# Patient Record
Sex: MALE | Race: BLACK OR AFRICAN AMERICAN | Employment: FULL TIME | URBAN - NONMETROPOLITAN AREA
[De-identification: names, ages, dates, MRNs, and addresses within clinical notes are randomized per-mention and may not be internally consistent; named-entity substitution may affect disease eponyms.]

---

## 2018-06-04 ENCOUNTER — HOSPITAL ENCOUNTER (EMERGENCY)
Age: 48
Discharge: HOME | End: 2018-06-04
Payer: COMMERCIAL

## 2018-06-04 VITALS
RESPIRATION RATE: 14 BRPM | SYSTOLIC BLOOD PRESSURE: 156 MMHG | DIASTOLIC BLOOD PRESSURE: 92 MMHG | OXYGEN SATURATION: 98 % | TEMPERATURE: 98.9 F | HEART RATE: 86 BPM

## 2018-06-04 DIAGNOSIS — L03.116 CELLULITIS OF LEFT LOWER LEG: Primary | ICD-10-CM

## 2018-06-04 PROCEDURE — 99212 OFFICE O/P EST SF 10 MIN: CPT

## 2018-06-04 PROCEDURE — 90471 IMMUNIZATION ADMIN: CPT | Performed by: NURSE PRACTITIONER

## 2018-06-04 PROCEDURE — 99203 OFFICE O/P NEW LOW 30 MIN: CPT | Performed by: NURSE PRACTITIONER

## 2018-06-04 PROCEDURE — 90715 TDAP VACCINE 7 YRS/> IM: CPT | Performed by: NURSE PRACTITIONER

## 2018-06-04 PROCEDURE — 6360000002 HC RX W HCPCS: Performed by: NURSE PRACTITIONER

## 2018-06-04 RX ORDER — MUPIROCIN CALCIUM 20 MG/G
CREAM TOPICAL
Qty: 1 TUBE | Refills: 0 | Status: SHIPPED | OUTPATIENT
Start: 2018-06-04 | End: 2018-07-04

## 2018-06-04 RX ORDER — CEPHALEXIN 500 MG/1
500 CAPSULE ORAL 4 TIMES DAILY
Qty: 40 CAPSULE | Refills: 0 | Status: SHIPPED | OUTPATIENT
Start: 2018-06-04 | End: 2018-06-14

## 2018-06-04 RX ADMIN — TETANUS TOXOID, REDUCED DIPHTHERIA TOXOID AND ACELLULAR PERTUSSIS VACCINE, ADSORBED 0.5 ML: 5; 2.5; 8; 8; 2.5 SUSPENSION INTRAMUSCULAR at 11:15

## 2018-06-04 ASSESSMENT — PAIN DESCRIPTION - DESCRIPTORS: DESCRIPTORS: BURNING

## 2018-06-04 ASSESSMENT — PAIN SCALES - GENERAL: PAINLEVEL_OUTOF10: 6

## 2018-06-04 ASSESSMENT — ENCOUNTER SYMPTOMS
COUGH: 0
NAUSEA: 0
SHORTNESS OF BREATH: 0
DIARRHEA: 0
ABDOMINAL PAIN: 0
ROS SKIN COMMENTS: SEE HPI
VOMITING: 0

## 2018-06-04 ASSESSMENT — PAIN DESCRIPTION - LOCATION: LOCATION: LEG

## 2018-06-04 ASSESSMENT — PAIN DESCRIPTION - PAIN TYPE: TYPE: ACUTE PAIN

## 2018-06-04 ASSESSMENT — PAIN DESCRIPTION - FREQUENCY: FREQUENCY: CONTINUOUS

## 2018-06-04 ASSESSMENT — PAIN DESCRIPTION - ORIENTATION: ORIENTATION: LEFT

## 2022-04-27 ENCOUNTER — HOSPITAL ENCOUNTER (INPATIENT)
Age: 52
LOS: 1 days | Discharge: HOME | DRG: 100 | End: 2022-04-28
Attending: EMERGENCY MEDICINE
Payer: COMMERCIAL

## 2022-04-27 ENCOUNTER — APPOINTMENT (OUTPATIENT)
Dept: MRI IMAGING | Age: 52
DRG: 100 | End: 2022-04-27
Payer: COMMERCIAL

## 2022-04-27 ENCOUNTER — APPOINTMENT (OUTPATIENT)
Dept: CT IMAGING | Age: 52
DRG: 100 | End: 2022-04-27
Payer: COMMERCIAL

## 2022-04-27 ENCOUNTER — APPOINTMENT (OUTPATIENT)
Dept: GENERAL RADIOLOGY | Age: 52
DRG: 100 | End: 2022-04-27
Payer: COMMERCIAL

## 2022-04-27 DIAGNOSIS — R56.9 SEIZURE (HCC): Primary | ICD-10-CM

## 2022-04-27 LAB
ALBUMIN SERPL-MCNC: 4.1 G/DL (ref 3.5–5.1)
ALP BLD-CCNC: 106 U/L (ref 38–126)
ALT SERPL-CCNC: 14 U/L (ref 11–66)
AMMONIA: 16 UMOL/L (ref 11–60)
AMPHETAMINE+METHAMPHETAMINE URINE SCREEN: NEGATIVE
ANION GAP SERPL CALCULATED.3IONS-SCNC: 14 MEQ/L (ref 8–16)
AST SERPL-CCNC: 20 U/L (ref 5–40)
BARBITURATE QUANTITATIVE URINE: NEGATIVE
BASOPHILS # BLD: 0.1 %
BASOPHILS ABSOLUTE: 0 THOU/MM3 (ref 0–0.1)
BENZODIAZEPINE QUANTITATIVE URINE: NEGATIVE
BILIRUB SERPL-MCNC: 0.4 MG/DL (ref 0.3–1.2)
BUN BLDV-MCNC: 12 MG/DL (ref 7–22)
CALCIUM SERPL-MCNC: 9.4 MG/DL (ref 8.5–10.5)
CANNABINOID QUANTITATIVE URINE: POSITIVE
CHLORIDE BLD-SCNC: 102 MEQ/L (ref 98–111)
CO2: 22 MEQ/L (ref 23–33)
COCAINE METABOLITE QUANTITATIVE URINE: NEGATIVE
CREAT SERPL-MCNC: 1.1 MG/DL (ref 0.4–1.2)
EKG ATRIAL RATE: 95 BPM
EKG P AXIS: 75 DEGREES
EKG P-R INTERVAL: 158 MS
EKG Q-T INTERVAL: 344 MS
EKG QRS DURATION: 68 MS
EKG QTC CALCULATION (BAZETT): 432 MS
EKG R AXIS: 98 DEGREES
EKG T AXIS: 63 DEGREES
EKG VENTRICULAR RATE: 95 BPM
EOSINOPHIL # BLD: 0.9 %
EOSINOPHILS ABSOLUTE: 0.1 THOU/MM3 (ref 0–0.4)
ERYTHROCYTE [DISTWIDTH] IN BLOOD BY AUTOMATED COUNT: 12.6 % (ref 11.5–14.5)
ERYTHROCYTE [DISTWIDTH] IN BLOOD BY AUTOMATED COUNT: 37.8 FL (ref 35–45)
ETHYL ALCOHOL, SERUM: < 0.01 %
GFR SERPL CREATININE-BSD FRML MDRD: 70 ML/MIN/1.73M2
GLUCOSE BLD-MCNC: 201 MG/DL (ref 70–108)
GLUCOSE BLD-MCNC: 205 MG/DL (ref 70–108)
HCT VFR BLD CALC: 46.7 % (ref 42–52)
HEMOGLOBIN: 15.1 GM/DL (ref 14–18)
IMMATURE GRANS (ABS): 0.06 THOU/MM3 (ref 0–0.07)
IMMATURE GRANULOCYTES: 0.4 %
LYMPHOCYTES # BLD: 7.3 %
LYMPHOCYTES ABSOLUTE: 1.2 THOU/MM3 (ref 1–4.8)
MAGNESIUM: 2 MG/DL (ref 1.6–2.4)
MCH RBC QN AUTO: 26.7 PG (ref 26–33)
MCHC RBC AUTO-ENTMCNC: 32.3 GM/DL (ref 32.2–35.5)
MCV RBC AUTO: 82.5 FL (ref 80–94)
MONOCYTES # BLD: 6.6 %
MONOCYTES ABSOLUTE: 1.1 THOU/MM3 (ref 0.4–1.3)
NUCLEATED RED BLOOD CELLS: 0 /100 WBC
OPIATES, URINE: NEGATIVE
OSMOLALITY CALCULATION: 281.4 MOSMOL/KG (ref 275–300)
OXYCODONE: NEGATIVE
PHENCYCLIDINE QUANTITATIVE URINE: NEGATIVE
PLATELET # BLD: 255 THOU/MM3 (ref 130–400)
PMV BLD AUTO: 8.9 FL (ref 9.4–12.4)
POTASSIUM REFLEX MAGNESIUM: 4.1 MEQ/L (ref 3.5–5.2)
PRO-BNP: 111.7 PG/ML (ref 0–900)
RBC # BLD: 5.66 MILL/MM3 (ref 4.7–6.1)
SEG NEUTROPHILS: 84.7 %
SEGMENTED NEUTROPHILS ABSOLUTE COUNT: 13.6 THOU/MM3 (ref 1.8–7.7)
SODIUM BLD-SCNC: 138 MEQ/L (ref 135–145)
TOTAL CK: 654 U/L (ref 55–170)
TOTAL PROTEIN: 7 G/DL (ref 6.1–8)
TROPONIN T: < 0.01 NG/ML
WBC # BLD: 16 THOU/MM3 (ref 4.8–10.8)

## 2022-04-27 PROCEDURE — 95819 EEG AWAKE AND ASLEEP: CPT | Performed by: PSYCHIATRY & NEUROLOGY

## 2022-04-27 PROCEDURE — 2580000003 HC RX 258: Performed by: PHYSICIAN ASSISTANT

## 2022-04-27 PROCEDURE — 96366 THER/PROPH/DIAG IV INF ADDON: CPT

## 2022-04-27 PROCEDURE — 82550 ASSAY OF CK (CPK): CPT

## 2022-04-27 PROCEDURE — 2580000003 HC RX 258: Performed by: EMERGENCY MEDICINE

## 2022-04-27 PROCEDURE — 80053 COMPREHEN METABOLIC PANEL: CPT

## 2022-04-27 PROCEDURE — 93010 ELECTROCARDIOGRAM REPORT: CPT | Performed by: INTERNAL MEDICINE

## 2022-04-27 PROCEDURE — 2060000000 HC ICU INTERMEDIATE R&B

## 2022-04-27 PROCEDURE — 71045 X-RAY EXAM CHEST 1 VIEW: CPT

## 2022-04-27 PROCEDURE — 96375 TX/PRO/DX INJ NEW DRUG ADDON: CPT

## 2022-04-27 PROCEDURE — 82077 ASSAY SPEC XCP UR&BREATH IA: CPT

## 2022-04-27 PROCEDURE — 84484 ASSAY OF TROPONIN QUANT: CPT

## 2022-04-27 PROCEDURE — 82948 REAGENT STRIP/BLOOD GLUCOSE: CPT

## 2022-04-27 PROCEDURE — 6360000002 HC RX W HCPCS: Performed by: PHYSICIAN ASSISTANT

## 2022-04-27 PROCEDURE — A9579 GAD-BASE MR CONTRAST NOS,1ML: HCPCS | Performed by: PHYSICIAN ASSISTANT

## 2022-04-27 PROCEDURE — 83880 ASSAY OF NATRIURETIC PEPTIDE: CPT

## 2022-04-27 PROCEDURE — 6360000002 HC RX W HCPCS: Performed by: EMERGENCY MEDICINE

## 2022-04-27 PROCEDURE — G0378 HOSPITAL OBSERVATION PER HR: HCPCS

## 2022-04-27 PROCEDURE — 83735 ASSAY OF MAGNESIUM: CPT

## 2022-04-27 PROCEDURE — 70450 CT HEAD/BRAIN W/O DYE: CPT

## 2022-04-27 PROCEDURE — 82140 ASSAY OF AMMONIA: CPT

## 2022-04-27 PROCEDURE — 96374 THER/PROPH/DIAG INJ IV PUSH: CPT

## 2022-04-27 PROCEDURE — 6360000004 HC RX CONTRAST MEDICATION: Performed by: PHYSICIAN ASSISTANT

## 2022-04-27 PROCEDURE — 93005 ELECTROCARDIOGRAM TRACING: CPT | Performed by: EMERGENCY MEDICINE

## 2022-04-27 PROCEDURE — 85025 COMPLETE CBC W/AUTO DIFF WBC: CPT

## 2022-04-27 PROCEDURE — 95819 EEG AWAKE AND ASLEEP: CPT

## 2022-04-27 PROCEDURE — 99285 EMERGENCY DEPT VISIT HI MDM: CPT

## 2022-04-27 PROCEDURE — 36415 COLL VENOUS BLD VENIPUNCTURE: CPT

## 2022-04-27 PROCEDURE — 70553 MRI BRAIN STEM W/O & W/DYE: CPT

## 2022-04-27 PROCEDURE — 80307 DRUG TEST PRSMV CHEM ANLYZR: CPT

## 2022-04-27 PROCEDURE — 6370000000 HC RX 637 (ALT 250 FOR IP): Performed by: PHYSICIAN ASSISTANT

## 2022-04-27 PROCEDURE — 96372 THER/PROPH/DIAG INJ SC/IM: CPT

## 2022-04-27 PROCEDURE — 99223 1ST HOSP IP/OBS HIGH 75: CPT | Performed by: PHYSICIAN ASSISTANT

## 2022-04-27 PROCEDURE — 96365 THER/PROPH/DIAG IV INF INIT: CPT

## 2022-04-27 RX ORDER — SODIUM CHLORIDE 0.9 % (FLUSH) 0.9 %
10 SYRINGE (ML) INJECTION EVERY 12 HOURS SCHEDULED
Status: DISCONTINUED | OUTPATIENT
Start: 2022-04-27 | End: 2022-04-28 | Stop reason: HOSPADM

## 2022-04-27 RX ORDER — SODIUM CHLORIDE 9 MG/ML
INJECTION, SOLUTION INTRAVENOUS CONTINUOUS
Status: DISCONTINUED | OUTPATIENT
Start: 2022-04-27 | End: 2022-04-28 | Stop reason: HOSPADM

## 2022-04-27 RX ORDER — 0.9 % SODIUM CHLORIDE 0.9 %
1000 INTRAVENOUS SOLUTION INTRAVENOUS ONCE
Status: COMPLETED | OUTPATIENT
Start: 2022-04-27 | End: 2022-04-27

## 2022-04-27 RX ORDER — LEVETIRACETAM 500 MG/1
500 TABLET ORAL EVERY 12 HOURS
Status: DISCONTINUED | OUTPATIENT
Start: 2022-04-27 | End: 2022-04-27

## 2022-04-27 RX ORDER — SODIUM CHLORIDE 9 MG/ML
INJECTION, SOLUTION INTRAVENOUS PRN
Status: DISCONTINUED | OUTPATIENT
Start: 2022-04-27 | End: 2022-04-28 | Stop reason: HOSPADM

## 2022-04-27 RX ORDER — ENOXAPARIN SODIUM 100 MG/ML
30 INJECTION SUBCUTANEOUS 2 TIMES DAILY
Status: DISCONTINUED | OUTPATIENT
Start: 2022-04-27 | End: 2022-04-28 | Stop reason: HOSPADM

## 2022-04-27 RX ORDER — ACETAMINOPHEN 325 MG/1
650 TABLET ORAL EVERY 6 HOURS PRN
Status: DISCONTINUED | OUTPATIENT
Start: 2022-04-27 | End: 2022-04-28 | Stop reason: HOSPADM

## 2022-04-27 RX ORDER — ONDANSETRON 2 MG/ML
4 INJECTION INTRAMUSCULAR; INTRAVENOUS EVERY 6 HOURS PRN
Status: DISCONTINUED | OUTPATIENT
Start: 2022-04-27 | End: 2022-04-28 | Stop reason: HOSPADM

## 2022-04-27 RX ORDER — ONDANSETRON 4 MG/1
4 TABLET, ORALLY DISINTEGRATING ORAL EVERY 8 HOURS PRN
Status: DISCONTINUED | OUTPATIENT
Start: 2022-04-27 | End: 2022-04-28 | Stop reason: HOSPADM

## 2022-04-27 RX ORDER — DIPHENHYDRAMINE HYDROCHLORIDE 50 MG/ML
25 INJECTION INTRAMUSCULAR; INTRAVENOUS ONCE
Status: COMPLETED | OUTPATIENT
Start: 2022-04-27 | End: 2022-04-27

## 2022-04-27 RX ORDER — POLYETHYLENE GLYCOL 3350 17 G/17G
17 POWDER, FOR SOLUTION ORAL DAILY PRN
Status: DISCONTINUED | OUTPATIENT
Start: 2022-04-27 | End: 2022-04-28 | Stop reason: HOSPADM

## 2022-04-27 RX ORDER — LORAZEPAM 2 MG/ML
1 INJECTION INTRAMUSCULAR EVERY 5 MIN PRN
Status: DISCONTINUED | OUTPATIENT
Start: 2022-04-27 | End: 2022-04-28 | Stop reason: HOSPADM

## 2022-04-27 RX ORDER — ACETAMINOPHEN 650 MG/1
650 SUPPOSITORY RECTAL EVERY 6 HOURS PRN
Status: DISCONTINUED | OUTPATIENT
Start: 2022-04-27 | End: 2022-04-28 | Stop reason: HOSPADM

## 2022-04-27 RX ORDER — LORAZEPAM 2 MG/ML
INJECTION INTRAMUSCULAR
Status: DISCONTINUED
Start: 2022-04-27 | End: 2022-04-27 | Stop reason: WASHOUT

## 2022-04-27 RX ORDER — KETOROLAC TROMETHAMINE 30 MG/ML
30 INJECTION, SOLUTION INTRAMUSCULAR; INTRAVENOUS ONCE
Status: COMPLETED | OUTPATIENT
Start: 2022-04-27 | End: 2022-04-27

## 2022-04-27 RX ORDER — METOCLOPRAMIDE HYDROCHLORIDE 5 MG/ML
10 INJECTION INTRAMUSCULAR; INTRAVENOUS ONCE
Status: COMPLETED | OUTPATIENT
Start: 2022-04-27 | End: 2022-04-27

## 2022-04-27 RX ORDER — SODIUM CHLORIDE 0.9 % (FLUSH) 0.9 %
10 SYRINGE (ML) INJECTION PRN
Status: DISCONTINUED | OUTPATIENT
Start: 2022-04-27 | End: 2022-04-28 | Stop reason: HOSPADM

## 2022-04-27 RX ORDER — LEVETIRACETAM 500 MG/1
1000 TABLET ORAL 2 TIMES DAILY
Status: DISCONTINUED | OUTPATIENT
Start: 2022-04-27 | End: 2022-04-28 | Stop reason: HOSPADM

## 2022-04-27 RX ADMIN — LEVETIRACETAM 1000 MG: 500 TABLET, FILM COATED ORAL at 23:02

## 2022-04-27 RX ADMIN — METOCLOPRAMIDE 10 MG: 5 INJECTION, SOLUTION INTRAMUSCULAR; INTRAVENOUS at 10:01

## 2022-04-27 RX ADMIN — ENOXAPARIN SODIUM 30 MG: 100 INJECTION SUBCUTANEOUS at 20:24

## 2022-04-27 RX ADMIN — LEVETIRACETAM 1000 MG: 100 INJECTION, SOLUTION INTRAVENOUS at 12:10

## 2022-04-27 RX ADMIN — LEVETIRACETAM 1500 MG: 100 INJECTION, SOLUTION INTRAVENOUS at 17:56

## 2022-04-27 RX ADMIN — GADOTERIDOL 20 ML: 279.3 INJECTION, SOLUTION INTRAVENOUS at 17:04

## 2022-04-27 RX ADMIN — SODIUM CHLORIDE: 9 INJECTION, SOLUTION INTRAVENOUS at 17:41

## 2022-04-27 RX ADMIN — DIPHENHYDRAMINE HYDROCHLORIDE 25 MG: 50 INJECTION, SOLUTION INTRAMUSCULAR; INTRAVENOUS at 10:01

## 2022-04-27 RX ADMIN — SODIUM CHLORIDE 1000 ML: 9 INJECTION, SOLUTION INTRAVENOUS at 09:46

## 2022-04-27 RX ADMIN — KETOROLAC TROMETHAMINE 30 MG: 30 INJECTION, SOLUTION INTRAMUSCULAR; INTRAVENOUS at 10:02

## 2022-04-27 ASSESSMENT — PAIN DESCRIPTION - ORIENTATION: ORIENTATION: RIGHT;LEFT

## 2022-04-27 ASSESSMENT — PAIN SCALES - GENERAL
PAINLEVEL_OUTOF10: 4
PAINLEVEL_OUTOF10: 8
PAINLEVEL_OUTOF10: 8
PAINLEVEL_OUTOF10: 0
PAINLEVEL_OUTOF10: 5

## 2022-04-27 ASSESSMENT — PAIN - FUNCTIONAL ASSESSMENT: PAIN_FUNCTIONAL_ASSESSMENT: 0-10

## 2022-04-27 ASSESSMENT — PAIN DESCRIPTION - LOCATION
LOCATION: GENERALIZED
LOCATION: ARM

## 2022-04-27 NOTE — PLAN OF CARE
Problem: Discharge Planning  Goal: Discharge to home or other facility with appropriate resources  Outcome: Progressing  Flowsheets (Taken 4/27/2022 1932)  Discharge to home or other facility with appropriate resources:   Identify barriers to discharge with patient and caregiver   Identify discharge learning needs (meds, wound care, etc)  Note: Patient educated that discharge plan is still in progress. Problem: Pain  Goal: Verbalizes/displays adequate comfort level or baseline comfort level  Outcome: Progressing  Flowsheets (Taken 4/27/2022 1932)  Verbalizes/displays adequate comfort level or baseline comfort level:   Encourage patient to monitor pain and request assistance   Assess pain using appropriate pain scale  Note: Patient denies pain at this time. Problem: Chronic Conditions and Co-morbidities  Goal: Patient's chronic conditions and co-morbidity symptoms are monitored and maintained or improved  Outcome: Progressing     Problem: Skin/Tissue Integrity  Goal: Absence of new skin breakdown  Description: 1. Monitor for areas of redness and/or skin breakdown  2. Assess vascular access sites hourly  3. Every 4-6 hours minimum:  Change oxygen saturation probe site  4. Every 4-6 hours:  If on nasal continuous positive airway pressure, respiratory therapy assess nares and determine need for appliance change or resting period. Outcome: Progressing  Note: Patient exhibits no new skin breakdown this shift. Patient repositined Q2H and as needed with staff assistance. All skin integrity issuse charted in Flowsheets. Will continue to monitor.        Problem: Hematologic - Adult  Goal: Maintains hematologic stability  Outcome: Progressing  Note: ,  Vitals:    04/27/22 1134 04/27/22 1155 04/27/22 1254 04/27/22 1418   BP: 133/65 (!) 167/81 110/77 (!) 149/94   Pulse: 88 116 106 97   Resp: 16 16 21 20   Temp: 98 °F (36.7 °C)   98.8 °F (37.1 °C)   TempSrc:    Oral   SpO2: 100% 97% 98% 100%   Weight: Problem: Safety - Adult  Goal: Free from fall injury  Outcome: Progressing  Flowsheets (Taken 4/27/2022 1932)  Free From Fall Injury: Instruct family/caregiver on patient safety  Note: Patient remains free from falls this shift. Fall precautions in place with bed/chair exit alarmed. Fall sign posted and fall armband in place. Nonskid footwear used with transferring. Educated patient to use call light when in need of staff assistance with transferring, ambulating, and other activities of daily living. Patient appropriately uses call light this shift. Care plan reviewed with patient and family. Patient and family verbalize understanding of the plan of care and contribute to goal setting.

## 2022-04-27 NOTE — ED NOTES
Pt returned from CT in stable condition. Appears to be resting on cot. No distress noted. Respirations even and unlabored. Call light In reach.       Shari Mak RN  04/27/22 2466

## 2022-04-27 NOTE — PROGRESS NOTES
Patient admitted to [facility] Room 06 from ED and via cart/stretcher. Complaint upon arrival to the room: Pain  IV site free of s/s of infection or infiltration. Vital signs obtained. Assessment and data collection initiated. Oriented to room. Policies and procedures for [facility]A explained. All questions answered with no further questions at this time. Fall prevention and safety brochure discussed with patient. 2 person skin check completed.

## 2022-04-27 NOTE — ED NOTES
Pt appears to be resting on cot. No distress noted. Respirations even and unlabored. Call light In reach.       Concepcion Herrera RN  04/27/22 7113

## 2022-04-27 NOTE — ED PROVIDER NOTES
[facility] COMPLAINT       Chief Complaint   Patient presents with    Seizures       Nurses Notes reviewed and I agree except as noted in the HPI. HISTORY OF PRESENT ILLNESS    Tia Méndez is a 46 y.o. male. Patient's  girlfriend reports 3 seizures last evening. At this point none this morning. Reported that there were some falls involved. He complained of a headache. Reportedly had some seizure activity a month ago and presented to a hospital in another city. She reports that they not do a CT of the head at the time and it was felt that it might be drug related due to her drug screen that was positive for meth amphetamines. They believe it is across reactant that he states he never has used methamphetamines or anything other than marijuana. He denies any recent alcohol use. REVIEW OF SYSTEMS         No fever, no chest pain, no dyspnea    Remainder of review of systems is otherwise reviewed as negative. PAST MEDICAL HISTORY    has a past medical history of Arthritis, Diabetes mellitus ([address] and SOURAV on CPAP. SURGICAL HISTORY      has a past surgical history that includes joint replacement (Right, ) and Ankle Fusion (Left, ). CURRENT MEDICATIONS       There are no discharge medications for this patient. ALLERGIES     has No Known Allergies. FAMILY HISTORY     He indicated that his mother is alive. He indicated that his father is . He indicated that the status of his maternal grandmother is unknown. He indicated that the status of his maternal aunt is unknown. He indicated that the status of his neg hx is unknown.   family history includes Cancer in his maternal aunt; Diabetes in his mother; Heart Attack in his father; Heart Disease in his father; Stroke in his maternal grandmother. SOCIAL HISTORY      reports that he has been smoking cigarettes. He has been smoking about 1.00 pack per day.  He has never used smokeless tobacco. He reports current alcohol use. He reports current drug use. Drug: Marijuana Jerelyn November). PHYSICAL EXAM     INITIAL VITALS:  weight is 270 lb (122.5 kg). His temperature is 98.8 °F (37.1 °C). His blood pressure is 149/94 (abnormal) and his pulse is 97. His respiration is 21 and oxygen saturation is 100%. Constitutional:  non-toxic   Eyes:  Pupils are equal and reactive, extraocular muscles intact   HENT:  Atraumatic appearing  oropharynx moist, no pharyngeal exudates. Neck- normal range of motion, no tenderness, supple   Respiratory:  No wheezing, rhonchi or rales  Cardiovascular: regular,  GI:  Non tender, no rigidity, rebound or guarding  Musculoskeletal:  2/4 distal pulses, no pitting edema  Integument: warm and dry  Neurologic:  Alert & oriented x 3, cranial nerves II through XII are grossly intact. Equal strength in the upper and lower extremities bilaterally. Psychiatric:  Speech and behavior appropriate          DIAGNOSTIC RESULTS     EKG: All EKG's are interpreted by the Emergency Department Physician who either signs or Co-signs this chart in the absence of a cardiologist.  EKG interpreted by me showing sinus rhythm at a rate of 95, QRS of 68, QTc of 432, axis of 98. RADIOLOGY: non-plain film images(s) such as CT, Ultrasound and MRI are read by the radiologist.  CT of the head was interpreted by the radiologist as negative    LABS:   Labs Reviewed   CBC WITH AUTO DIFFERENTIAL - Abnormal; Notable for the following components:       Result Value    WBC 16.0 (*)     MPV 8.9 (*)     Segs Absolute 13.6 (*)     All other components within normal limits   COMPREHENSIVE METABOLIC PANEL W/ REFLEX TO MG FOR LOW K - Abnormal; Notable for the following components:    Glucose 205 (*)     CO2 22 (*)     All other components within normal limits   CK - Abnormal; Notable for the following components:     Total  (*)     All other components within normal limits   GLOMERULAR FILTRATION RATE, ESTIMATED - Abnormal; Notable for the following components:    Est, Glom Filt Rate 70 (*)     All other components within normal limits   POCT GLUCOSE - Abnormal; Notable for the following components:    POC Glucose 201 (*)     All other components within normal limits   AMMONIA   TROPONIN   BRAIN NATRIURETIC PEPTIDE   ETHANOL   ANION GAP   OSMOLALITY   URINE DRUG SCREEN       EMERGENCY DEPARTMENT COURSE:   Vitals:    Vitals:    04/27/22 1134 04/27/22 1155 04/27/22 1254 04/27/22 1418   BP: 133/65 (!) 167/81 110/77 (!) 149/94   Pulse: 88 116 106 97   Resp: 16 16 21    Temp: 98 °F (36.7 °C)   98.8 °F (37.1 °C)   SpO2: 100% 97% 98% 100%   Weight:         Patient has been doing relatively well emergency department over several hours was fully awake and neurologically normal, had a negative CT scan of the head, just prior to disposition he had a seizure right in front of us and I likely think that there is a component of sleep apnea and his tongue fell back during this episode he actually did desaturate until the seizure had stopped. At this point I loaded him with Keppra and we are changing plans to admission as this is now for seizures with any 24-hour period. Case discussed with the hospitalist      CRITICAL CARE:   20 min         FINAL IMPRESSION      Multiple seizures within 24-hour. With hypoxemia    DISPOSITION/PLAN   Admitted    DISCHARGE MEDICATIONS:  There are no discharge medications for this patient.       (Please note that portions of this note were completed with a voice recognition program.  Efforts were made to edit the dictations but occasionally words are mis-transcribed.)    Gissel Alcazar, [address] [place] [address] Samara,   04/27/22 7629

## 2022-04-27 NOTE — CONSULTS
Neurology Consult Note    Date:4/27/2022       [facility]:[id number]  Patient Name:Crescencio Bowen     YOB: 1970     Age:51 y.o. Requesting Physician: CESIA Torrez     Reason for Consult:  Evaluate for seizures      Chief Complaint:   Chief Complaint   Patient presents with    Seizures       Subjective     Charisma Peña is a 46 y.o. male with a history of arthritis, diabetes, and obstructive sleep apnea who presents for evaluation management of seizures. The patient is currently drowsy and postictal, so history is obtained by his girlfriend who was present at bedside. She states the patient had his first seizure on March 13, 2022. There was an approximately 2-minute generalized tonic-clonic seizure with associated urinary incontinence, tongue biting, and postictal confusion. He proceeded to go to [facility] where he was given Narcan and subsequently discharged. His urine drug was positive at that time for amphetamines, but she states the patient has been using over-the-counter allergy medications. The patient had been seizure-free until last evening. Throughout the night, the patient was sleeping, he had 3 generalized tonic-clonic seizures. The first seizure was approximately 1 minute, the second seizure was approximately 1 to 2 minutes, and the third seizure lasted over 2 minutes. 2 of the seizures were associated with urinary incontinence and the patient also bit his tongue again. He was noted to be postictal.  The patient was brought to the emergency department this morning. While in the ER, he had 50 second generalized tonic-clonic seizure. He was initiated on Keppra. The patient has no history of seizure disorder prior to March 13. He has no recent history of head trauma or any history of any neurologic conditions.     Review of Systems   Review of Systems   Unable to perform ROS: Other (drowsy and postictal)     Medications   Scheduled Meds:    sodium chloride flush  10 mL IntraVENous 2 times per day    enoxaparin  30 mg SubCUTAneous BID    levETIRAcetam  1,000 mg Oral BID    levetiracetam  1,500 mg IntraVENous Once     Continuous Infusions:    sodium chloride      sodium chloride       PRN Meds: LORazepam, sodium chloride flush, sodium chloride, ondansetron **OR** ondansetron, polyethylene glycol, acetaminophen **OR** acetaminophen  Medications Prior to Admission:   No current facility-administered medications on file prior to encounter. No current outpatient medications on file prior to encounter. Past History    Past Medical History:   has a past medical history of Arthritis, Diabetes mellitus ([address] and SOURAV on CPAP. Social History:   reports that he has been smoking cigarettes. He has been smoking about 1.00 pack per day. He has never used smokeless tobacco. He reports current alcohol use. He reports current drug use. Drug: Marijuana Emilia Aver). Family History:   Family History   Problem Relation Age of Onset    Diabetes Mother     Heart Disease Father     Heart Attack Father     Cancer Maternal Aunt         abdominal mass    Stroke Maternal Grandmother     Asthma Neg Hx        Physical Examination      Vitals:  BP (!) 149/94   Pulse 97   Temp 98.8 °F (37.1 °C) (Oral)   Resp 20   Wt 270 lb (122.5 kg)   SpO2 100%   BMI 36.62 kg/m²   Temp (24hrs), Av.4 °F (36.9 °C), Min:98 °F (36.7 °C), Max:98.8 °F (37.1 °C)      I/O (24Hr): Intake/Output Summary (Last 24 hours) at 2022 1712  Last data filed at 2022 1046  Gross per 24 hour   Intake 1000 ml   Output --   Net 1000 ml         Physical Exam  Vitals reviewed. Constitutional:       General: He is not in acute distress. Appearance: Normal appearance. He is not ill-appearing. HENT:      Head: Normocephalic and atraumatic.       Right Ear: External ear normal.      Left Ear: External ear normal.      Nose: Nose normal.      Mouth/Throat:      Mouth: Mucous membranes are moist. Pharynx: No oropharyngeal exudate or posterior oropharyngeal erythema. Comments: Laceration anterior aspect of tongue. Dried blood noted on patient's lips. Eyes:      Extraocular Movements: EOM normal.      Pupils: Pupils are equal, round, and reactive to light. Cardiovascular:      Rate and Rhythm: Normal rate and regular rhythm. Heart sounds: Normal heart sounds. No murmur heard. Pulmonary:      Effort: Pulmonary effort is normal. No respiratory distress. Breath sounds: Normal breath sounds. No wheezing. Abdominal:      General: Bowel sounds are normal.      Palpations: Abdomen is soft. Tenderness: There is no abdominal tenderness. Musculoskeletal:         General: Normal range of motion. Right lower leg: No edema. Left lower leg: No edema. Skin:     General: Skin is warm. Findings: No rash. Neurological:      Mental Status: He is alert and oriented to person, place, and time. Psychiatric:         Mood and Affect: Mood normal.         Speech: Speech normal.         Behavior: Behavior normal.       Neurologic Exam     Mental Status   Oriented to person, place, and time. Attention: normal. Concentration: normal.   Speech: speech is normal   Level of consciousness: alert  Normal comprehension. Cranial Nerves     CN II   Visual fields full to confrontation. Right visual field deficit: none  Left visual field deficit: none     CN III, IV, VI   Pupils are equal, round, and reactive to light. Extraocular motions are normal.   Right pupil: Shape: regular. Reactivity: brisk. Left pupil: Shape: regular. Reactivity: brisk. CN V   Facial sensation intact. Right facial sensation deficit: none  Left facial sensation deficit: none    CN VII   Facial expression full, symmetric.    Right facial weakness: none  Left facial weakness: none    CN VIII   CN VIII normal.   Hearing: intact    CN IX, X   CN IX normal.   CN X normal.   Palate: symmetric    CN XI   CN XI normal.   Right trapezius strength: normal  Left trapezius strength: normal    CN XII   CN XII normal.   Tongue: not atrophic  Fasciculations: absent  Tongue deviation: none    Motor Exam   Muscle bulk: normal  Overall muscle tone: normal  Right arm tone: normal  Left arm tone: normal  Right leg tone: normal  Left leg tone: normal  Muscle strength 5/5 in bilateral upper and lower extremities     Sensory Exam   Light touch normal.        Labs/Imaging/Diagnostics   Labs:  CBC:  Recent Labs     04/27/22 0902   WBC 16.0*   RBC 5.66   HGB 15.1   HCT 46.7   MCV 82.5        CHEMISTRIES:  Recent Labs     04/27/22 0902      K 4.1      CO2 22*   BUN 12   CREATININE 1.1   GLUCOSE 205*     PT/INR:No results for input(s): PROTIME, INR in the last 72 hours. APTT:No results for input(s): APTT in the last 72 hours. LIVER PROFILE:  Recent Labs     04/27/22 0902   AST 20   ALT 14   BILITOT 0.4   ALKPHOS 106     Imaging Last 24 Hours:  CT Head WO Contrast    Result Date: 4/27/2022  PROCEDURE: CT HEAD WO CONTRAST CLINICAL INFORMATION: seizures. COMPARISON: No prior study. TECHNIQUE: Noncontrast 5 mm axial images were obtained through the brain. Sagittal and coronal reconstructions were obtained. All CT scans at this facility use dose modulation, iterative reconstruction, and/or weight-based dosing when appropriate to reduce radiation dose to as low as reasonably achievable. FINDINGS: There is dural calcification. There is no other parenchymal abnormality. There is no hemorrhage. There are no intra-or extra-axial collections. There is no hydrocephalus, midline shift or mass effect. The gray-white matter differentiation is preserved. There is a tiny retention cyst or polyp in the left maxillary sinus. There is minimal mucosal thickening in the ethmoid air cells bilaterally. The remaining paranasal sinuses and mastoid air cells are normally aerated. There is no suspicious calvarial abnormality.        1. Negative noncontrast CT scan of the brain. . **This report has been created using voice recognition software. It may contain minor errors which are inherent in voice recognition technology. ** Final report electronically signed by DR Adi Taveras on 4/27/2022 10:17 AM    XR CHEST PORTABLE    Result Date: 4/27/2022  PROCEDURE: XR CHEST PORTABLE CLINICAL INFORMATION: seizures/falls COMPARISON: No prior study. TECHNIQUE: A single mobile view of the chest was obtained. Normal mobile chest. **This report has been created using voice recognition software. It may contain minor errors which are inherent in voice recognition technology. ** Final report electronically signed by Dr. Verito Ross on 4/27/2022 9:02 AM        Assessment and Plan:          1. Seizures  · CT head negative for acute findings  · Keppra initiated in emergency department. 1 g was given. We will give the patient an additional 1500 mg at this time for an appropriate loading dose. · Start Keppra 1 g twice daily. · EEG without epileptiform activity. · MRI brain with and without contrast pending. · Seizure precautions. IV Ativan as needed for any acute seizures. Please inform our team if any further seizures occur. · Neurology will continue to follow. This patient was seen and evaluated with Dr. Priscila Patiño and he is in agreement with the assessment and plan. Electronically signed by Rod Najera PA-C on 4/27/22 at 5:19 PM EDT    I agree with evaluation and plan. Patient is with new onset seizure. Continue keppra and seizure work up.     Bell Ricks MD

## 2022-04-27 NOTE — ED NOTES
RN called to room due to patient having an apparent seizure. This RN notes that the patient is breathing very heavily, o2 at 56%. Marlin Damon noted that when she found him he was blue in the face. Pt appears diaphoretic.        Elisabet Estevez RN  04/27/22 7663

## 2022-04-27 NOTE — ED NOTES
Pt transported to 4A06 by cart in stable condition. Called 4A and informed Isaias Hernandez that the patient was on their way to the unit.      Rob Arciniega, RUSS  00/02/76 7617

## 2022-04-27 NOTE — PROCEDURES
Date: 4/27/2022  Referring physician: Jasmin Corral PA-C    Indication  Patient aged 46 y with confusion. EEG done to assess for epileptiform activity. Introduction  This routine 20-minute EEG was recorded using the [employer] 10-20 System on a [employer] workstation at 256 samples/s. Automated spike and seizure detection algorithms were applied. Description  During the maximal alert state, a well-regulated, symmetric, and reactive 8-9 Hz posterior dominant rhythm was seen. No consistent focal slowing or interhemispheric asymmetry was noted. Stage I and stage II sleep were observed. There were no interictal epileptiform discharges or electrographic seizures. Activations  Hyperventilation was not performed. Intermittent photic stimulation was performed and demonstrated no posterior driving response. Impression  Normal awake and sleep EEG. EKG lead did not show clear arrhythmia, if still in concern consider formal EKG or correlation with telemetry. No epileptiform discharges were identified. Please note the absence of such activity on this record cannot conclusively rule out an epileptic disorder. If such is still clinically suspected, a repeat study with sleep deprivation and/or prolonged sampling may be helpful. Fabio Nyhan MD  Epilepsy Board Certified. Neurology Board Certified.     Electronically Signed

## 2022-04-27 NOTE — PROGRESS NOTES
[facility] Laboratory Technician Worksheet      EEG Date: 2022    Name: Yong Dale   : 1970   Age: 46 y.o. SEX: male    ROOM: 1 MRN: [id number]           CSN: [id number]      Ordering Provider: Eric  EEG Number: [id number] Time of Test:  7221    Hand: Right   Sedation: no, keppra loading dose just given    H.V. Done: No not done Photic: Yes    Sleep: Yes  Drowsy: No   Sleep Deprived: No    Seizures observed: no    Mentality: lethargic      Clinical History:  Girlfriend stated seizure through night, 50 sec seizure in Emergency room, no ativan, mri pending  Ct head  Impression       1. Negative noncontrast CT scan of the brain. .       Past Medical History:       Diagnosis Date    Arthritis     hips, ankles, hands    Diabetes mellitus (HCC)     SOURAV on CPAP        Scheduled Meds:   LORazepam        levETIRAcetam  500 mg Oral Q12H    sodium chloride flush  10 mL IntraVENous 2 times per day    enoxaparin  30 mg SubCUTAneous BID    [START ON 2022] levetiracetam  1,000 mg IntraVENous Q12H     Continuous Infusions:   sodium chloride      sodium chloride       PRN Meds:. LORazepam, sodium chloride flush, sodium chloride, ondansetron **OR** ondansetron, polyethylene glycol, acetaminophen **OR** acetaminophen    Technician: Marleen Samaniego 2022

## 2022-04-27 NOTE — H&P
History & Physical    Patient:  Shaun Pulliam  YOB: 1970  Date of Service: 4/27/2022  MRN: [id number]   Acct:  [de-identified]   Primary Care Physician: Chapito Shannon MD    Chief Complaint: Seizures    Assessment/Plan:    1.) Seizures, acute:  Unknown etiology. Patient has had several episodes of seizure activity over the last month with associated headache and visual disturbance. Witnessed event by nursing staff in the ER. Given 1 gram of Keppra and 2 mg of Ativan. Alcohol level negative. CT of head unremarkable. Consult neurology. Seizure precautions. NPO. Obtain urine drug screen. 2.) Acute hypoxic respiratory failure: Hypoxic episodes during seizure activity. CXR unremarkable. On 2 liters nasal cannula with current saturations at 97%. Wean O2 as tolerated. Encourage cough and deep breathing.  IS     3.)  Diabetes Mellitus: Hemoglobin A1C-7.9 (4/18). Initiate low dose SS with hypoglycemic protocol. POCT. Carb control diet if able to pass bedside swallow. Family is bring in home medications to verify before insulin is resumed. 4.) Tobacco abuse: Smoking cessation education    5.) History of meth amphetamine use: Aware      History of Present Illness:   History obtained from chart review. The patient is a 46 y.o. male with history of diabetes, meth amphetamine abuse, and SOURAV, presents with complaints of seizures. History is limited as the patient is post ictal and very drowsy. No family at bedside. The patient reportedly had 3 seizures last evening while he was sleeping. These events were witnessed by his girlfriend who reports that during these episodes, the patient becomes cyanotic, bites his tongue, and is incontinent of urine. There are associated headaches with spots in his vision. He had reportedly had an episode about a month ago and was seen at [facility] but it was felt to be drug related so he was discharged.  Patient had seizure activity in the ER that was witnessed by nursing staff and lasted approximately 50 seconds. During this episode, the patient became hypoxic with saturations down to 56%. The patient has denied any recent alcohol or drug use. Patient denies any recent illnesses. Past Medical History:        Diagnosis Date    Arthritis     hips, ankles, hands    Diabetes mellitus (HCC)     SOURAV on CPAP        Past Surgical History:        Procedure Laterality Date    ANKLE FUSION Left 1987    JOINT REPLACEMENT Right 2013    hip, [facility]- Dr. Ron Arzola Medications:   No current facility-administered medications on file prior to encounter. No current outpatient medications on file prior to encounter. Allergies:  Patient has no known allergies. Social History:    reports that he has been smoking cigarettes. He has been smoking about 1.00 pack per day. He has never used smokeless tobacco. He reports current alcohol use. He reports current drug use. Drug: Marijuana [employer]). Family History:       Problem Relation Age of Onset    Diabetes Mother     Heart Disease Father     Heart Attack Father     Cancer Maternal Aunt         abdominal mass    Stroke Maternal Grandmother     Asthma Neg Hx        Review of systems:  Unable to assess due to  mentation        Vitals:   Vitals:    04/27/22 1155   BP: (!) 167/81   Pulse: 116   Resp: 16   Temp:    SpO2: 97%      BMI: Body mass index is 36.62 kg/m². Exam:  Physical Examination: General appearance Drowsy. Oriented to name only. Mental status - alert, oriented to person, place, and time, drowsy  Neck - supple, no significant adenopathy, no JVD, or carotid bruits  Chest - clear to auscultation, no wheezes, rales or rhonchi, symmetric air entry  Heart -tachycardic. No murmur. S1, S2  Abdomen - soft, nontender, nondistended, no masses or organomegaly  Neurological - Oriented to name only. Speech slurred. Words garbled.   Musculoskeletal - no joint tenderness, deformity or swelling  Extremities - peripheral pulses normal, no pedal edema, no clubbing or cyanosis  Skin - normal coloration and turgor, no rashes, no suspicious skin lesions noted      Review of Labs and Diagnostic Testing:    Recent Results (from the past 24 hour(s))   POCT Glucose    Collection Time: 04/27/22  8:49 AM   Result Value Ref Range    POC Glucose 201 (H) 70 - 108 mg/dl   CBC with Auto Differential    Collection Time: 04/27/22  9:02 AM   Result Value Ref Range    WBC 16.0 (H) 4.8 - 10.8 thou/mm3    RBC 5.66 4.70 - 6.10 mill/mm3    Hemoglobin 15.1 14.0 - 18.0 gm/dl    Hematocrit 46.7 42.0 - 52.0 %    MCV 82.5 80.0 - 94.0 fL    MCH 26.7 26.0 - 33.0 pg    MCHC 32.3 32.2 - 35.5 gm/dl    RDW-CV 12.6 11.5 - 14.5 %    RDW-SD 37.8 35.0 - 45.0 fL    Platelets 939 992 - 561 thou/mm3    MPV 8.9 (L) 9.4 - 12.4 fL    Seg Neutrophils 84.7 %    Lymphocytes 7.3 %    Monocytes 6.6 %    Eosinophils 0.9 %    Basophils 0.1 %    Immature Granulocytes 0.4 %    Segs Absolute 13.6 (H) 1.8 - 7.7 thou/mm3    Lymphocytes Absolute 1.2 1.0 - 4.8 thou/mm3    Monocytes Absolute 1.1 0.4 - 1.3 thou/mm3    Eosinophils Absolute 0.1 0.0 - 0.4 thou/mm3    Basophils Absolute 0.0 0.0 - 0.1 thou/mm3    Immature Grans (Abs) 0.06 0.00 - 0.07 thou/mm3    nRBC 0 /100 wbc   Comprehensive Metabolic Panel w/ Reflex to MG    Collection Time: 04/27/22  9:02 AM   Result Value Ref Range    Glucose 205 (H) 70 - 108 mg/dL    CREATININE 1.1 0.4 - 1.2 mg/dL    BUN 12 7 - 22 mg/dL    Sodium 138 135 - 145 meq/L    Potassium reflex Magnesium 4.1 3.5 - 5.2 meq/L    Chloride 102 98 - 111 meq/L    CO2 22 (L) 23 - 33 meq/L    Calcium 9.4 8.5 - 10.5 mg/dL    AST 20 5 - 40 U/L    Alkaline Phosphatase 106 38 - 126 U/L    Total Protein 7.0 6.1 - 8.0 g/dL    Albumin 4.1 3.5 - 5.1 g/dL    Total Bilirubin 0.4 0.3 - 1.2 mg/dL    ALT 14 11 - 66 U/L   Ammonia    Collection Time: 04/27/22  9:02 AM   Result Value Ref Range    Ammonia 16 11 - 60 umol/L   CK    Collection Time: 04/27/22 9:02 AM   Result Value Ref Range    Total  (H) 55 - 170 U/L   Troponin    Collection Time: 04/27/22  9:02 AM   Result Value Ref Range    Troponin T < 0.010 ng/ml   Brain Natriuretic Peptide    Collection Time: 04/27/22  9:02 AM   Result Value Ref Range    Pro-.7 0.0 - 900.0 pg/mL   Ethanol    Collection Time: 04/27/22  9:02 AM   Result Value Ref Range    ETHYL ALCOHOL, SERUM < 0.01 0.00 %   Anion Gap    Collection Time: 04/27/22  9:02 AM   Result Value Ref Range    Anion Gap 14.0 8.0 - 16.0 meq/L   Osmolality    Collection Time: 04/27/22  9:02 AM   Result Value Ref Range    Osmolality Calc 281.4 275.0 - 300.0 mOsmol/kg   Glomerular Filtration Rate, Estimated    Collection Time: 04/27/22  9:02 AM   Result Value Ref Range    Est, Glom Filt Rate 70 (A) ml/min/1.73m2       Radiology:     CT Head WO Contrast    Result Date: 4/27/2022  PROCEDURE: CT HEAD WO CONTRAST CLINICAL INFORMATION: seizures. COMPARISON: No prior study. TECHNIQUE: Noncontrast 5 mm axial images were obtained through the brain. Sagittal and coronal reconstructions were obtained. All CT scans at this facility use dose modulation, iterative reconstruction, and/or weight-based dosing when appropriate to reduce radiation dose to as low as reasonably achievable. FINDINGS: There is dural calcification. There is no other parenchymal abnormality. There is no hemorrhage. There are no intra-or extra-axial collections. There is no hydrocephalus, midline shift or mass effect. The gray-white matter differentiation is preserved. There is a tiny retention cyst or polyp in the left maxillary sinus. There is minimal mucosal thickening in the ethmoid air cells bilaterally. The remaining paranasal sinuses and mastoid air cells are normally aerated. There is no suspicious calvarial abnormality. 1. Negative noncontrast CT scan of the brain. . **This report has been created using voice recognition software.  It may contain minor errors which are inherent in voice recognition technology. ** Final report electronically signed by DR Jam Lara on 4/27/2022 10:17 AM    XR CHEST PORTABLE    Result Date: 4/27/2022  PROCEDURE: XR CHEST PORTABLE CLINICAL INFORMATION: seizures/falls COMPARISON: No prior study. TECHNIQUE: A single mobile view of the chest was obtained. Normal mobile chest. **This report has been created using voice recognition software. It may contain minor errors which are inherent in voice recognition technology. ** Final report electronically signed by Dr. Carol Bryant on 4/27/2022 9:02 AM        EKG: None on file        DVT prophylaxis: [] Lovenox                                 [x] SCDs                                 [] SQ Heparin                                 [] Encourage ambulation, low risk for DVT, no chemical or mechanical prophylaxis necessary              [] Already on Anticoagulation                Anticipated Disposition upon discharge: [x] Home                                                                         [] Home with Home Health                                                                         [] Chandu Ruff                                                                         [] [address]          Electronically signed by Reagan Faye on 4/27/2022 at 12:41 PM  Electronically signed by [facility] PA on 4/27/2022 at 3:20 PM

## 2022-04-27 NOTE — ED TRIAGE NOTES
Pt to ED due to experiencing \"three seizures\" last night during sleep. Girlfriend states that they woke her up. Girlfriend states that his lips turned blue, bit his tongue and had blood in his mouth, and urinated on himself. Girlfriend states that he fell out of bed with one of the seizures. Pt appears wobbly and unsteady on his feet. Pt A&Ox3. EKG one. VSS.

## 2022-04-27 NOTE — ED NOTES
ED to inpatient nurses report    Chief Complaint   Patient presents with    Seizures      Present to ED from home  LOC: alert and orientated to name, place, date  Vital signs   Vitals:    04/27/22 0948 04/27/22 1044 04/27/22 1134 04/27/22 1155   BP: 130/71 (!) 147/86 133/65 (!) 167/81   Pulse: 93 92 88 116   Resp: 16 16 16 16   Temp:   98 °F (36.7 °C)    SpO2: 99% 99% 100% 97%   Weight:          Oxygen Baseline 97%    Current needs required 2L NC Bipap/Cpap No  LDAs:   Peripheral IV 04/27/22 Left Antecubital (Active)   Site Assessment Clean, dry & intact 04/27/22 1044   Line Status Infusing 04/27/22 1044   Phlebitis Assessment No symptoms 04/27/22 1044   Infiltration Assessment 0 04/27/22 1044   Dressing Status Clean, dry & intact 04/27/22 1044       Peripheral IV 04/27/22 Right Antecubital (Active)   Site Assessment Clean, dry & intact 04/27/22 1157   Line Status Blood return noted; Flushed 04/27/22 1157   Phlebitis Assessment No symptoms 04/27/22 1157   Infiltration Assessment 0 04/27/22 1157   Dressing Status Clean, dry & intact 04/27/22 1157     Mobility: Independent  Pending ED orders: NA  Present condition: stable      Electronically signed by Donna Graham RN on 4/27/2022 at 12:35 PM       Donna Graham RN  04/27/22 7491

## 2022-04-28 VITALS
DIASTOLIC BLOOD PRESSURE: 87 MMHG | OXYGEN SATURATION: 99 % | BODY MASS INDEX: 36.62 KG/M2 | WEIGHT: 270 LBS | SYSTOLIC BLOOD PRESSURE: 139 MMHG | HEART RATE: 88 BPM | RESPIRATION RATE: 16 BRPM | TEMPERATURE: 98.4 F

## 2022-04-28 LAB
ANION GAP SERPL CALCULATED.3IONS-SCNC: 15 MEQ/L (ref 8–16)
BUN BLDV-MCNC: 12 MG/DL (ref 7–22)
CALCIUM SERPL-MCNC: 9.1 MG/DL (ref 8.5–10.5)
CHLORIDE BLD-SCNC: 105 MEQ/L (ref 98–111)
CO2: 19 MEQ/L (ref 23–33)
CREAT SERPL-MCNC: 0.9 MG/DL (ref 0.4–1.2)
GFR SERPL CREATININE-BSD FRML MDRD: 88 ML/MIN/1.73M2
GLUCOSE BLD-MCNC: 175 MG/DL (ref 70–108)
LACTIC ACID: 1.4 MMOL/L (ref 0.5–2)
POTASSIUM REFLEX MAGNESIUM: 3.6 MEQ/L (ref 3.5–5.2)
SODIUM BLD-SCNC: 139 MEQ/L (ref 135–145)

## 2022-04-28 PROCEDURE — G0378 HOSPITAL OBSERVATION PER HR: HCPCS

## 2022-04-28 PROCEDURE — 83605 ASSAY OF LACTIC ACID: CPT

## 2022-04-28 PROCEDURE — 80048 BASIC METABOLIC PNL TOTAL CA: CPT

## 2022-04-28 PROCEDURE — 6370000000 HC RX 637 (ALT 250 FOR IP): Performed by: PHYSICIAN ASSISTANT

## 2022-04-28 PROCEDURE — 96372 THER/PROPH/DIAG INJ SC/IM: CPT

## 2022-04-28 PROCEDURE — 36415 COLL VENOUS BLD VENIPUNCTURE: CPT

## 2022-04-28 PROCEDURE — 99239 HOSP IP/OBS DSCHRG MGMT >30: CPT | Performed by: PHYSICIAN ASSISTANT

## 2022-04-28 PROCEDURE — 99232 SBSQ HOSP IP/OBS MODERATE 35: CPT | Performed by: PHYSICIAN ASSISTANT

## 2022-04-28 PROCEDURE — 6360000002 HC RX W HCPCS: Performed by: PHYSICIAN ASSISTANT

## 2022-04-28 RX ORDER — LEVETIRACETAM 1000 MG/1
1000 TABLET ORAL 2 TIMES DAILY
Qty: 60 TABLET | Refills: 3 | Status: SHIPPED | OUTPATIENT
Start: 2022-04-28 | End: 2022-07-15

## 2022-04-28 RX ORDER — LISINOPRIL 2.5 MG/1
2.5 TABLET ORAL DAILY
COMMUNITY

## 2022-04-28 RX ORDER — PIOGLITAZONEHYDROCHLORIDE 15 MG/1
30 TABLET ORAL DAILY
COMMUNITY

## 2022-04-28 RX ADMIN — ENOXAPARIN SODIUM 30 MG: 100 INJECTION SUBCUTANEOUS at 08:17

## 2022-04-28 RX ADMIN — LEVETIRACETAM 1000 MG: 500 TABLET, FILM COATED ORAL at 08:17

## 2022-04-28 ASSESSMENT — PAIN SCALES - GENERAL: PAINLEVEL_OUTOF10: 0

## 2022-04-28 NOTE — PROGRESS NOTES
Discharge teaching and instructions for diagnosis/procedure of Seizure completed with patient using teachback method. AVS reviewed. Printed prescriptions given to patient. Patient voiced understanding regarding prescriptions, follow up appointments, and care of self at home. Discharged in a wheelchair to home with support per family.

## 2022-04-28 NOTE — PROGRESS NOTES
Neurology Progress Note    Date:4/28/2022       IRBF:[id number]  Patient Name:Crescencio Bowen     YOB: 1970     Age:51 y.o. Chief Complaint:   Chief Complaint   Patient presents with    Seizures       Subjective     Stan Lance is a 46 y.o. male with a history of arthritis, diabetes, and obstructive sleep apnea who presents for evaluation management of seizures. The patient is currently drowsy and postictal, so history is obtained by his girlfriend who was present at bedside. She states the patient had his first seizure on March 13, 2022. There was an approximately 2-minute generalized tonic-clonic seizure with associated urinary incontinence, tongue biting, and postictal confusion. He proceeded to go to [facility] where he was given Narcan and subsequently discharged. His urine drug was positive at that time for amphetamines, but she states the patient has been using over-the-counter allergy medications. The patient had been seizure-free until last evening. Throughout the night, the patient was sleeping, he had 3 generalized tonic-clonic seizures. The first seizure was approximately 1 minute, the second seizure was approximately 1 to 2 minutes, and the third seizure lasted over 2 minutes. 2 of the seizures were associated with urinary incontinence and the patient also bit his tongue again. He was noted to be postictal.  The patient was brought to the emergency department this morning. While in the ER, he had 50 second generalized tonic-clonic seizure. He was initiated on Keppra. The patient has no history of seizure disorder prior to March 13. He has no recent history of head trauma or any history of any neurologic conditions. Interval history 4/28/22: The patient is doing much better today. He is alert and oriented. He is no longer drowsy or postictal.  He has no neurologic complaints at this time. He has had no further seizures.     Review of Systems   Review of Systems   Eyes: Negative for visual disturbance. Neurological: Negative for dizziness, seizures, facial asymmetry, speech difficulty, weakness, light-headedness, numbness and headaches. Medications   Scheduled Meds:    sodium chloride flush  10 mL IntraVENous 2 times per day    enoxaparin  30 mg SubCUTAneous BID    levETIRAcetam  1,000 mg Oral BID     Continuous Infusions:    sodium chloride      sodium chloride Stopped (22 0831)     PRN Meds: LORazepam, sodium chloride flush, sodium chloride, ondansetron **OR** ondansetron, polyethylene glycol, acetaminophen **OR** acetaminophen  Medications Prior to Admission:   No current facility-administered medications on file prior to encounter. No current outpatient medications on file prior to encounter. Past History    Past Medical History:   has a past medical history of Arthritis, Diabetes mellitus ([address] and SOURAV on CPAP. Social History:   reports that he has been smoking cigarettes. He has been smoking about 1.00 pack per day. He has never used smokeless tobacco. He reports current alcohol use. He reports current drug use. Drug: Marijuana [employer]). Family History:   Family History   Problem Relation Age of Onset    Diabetes Mother     Heart Disease Father     Heart Attack Father     Cancer Maternal Aunt         abdominal mass    Stroke Maternal Grandmother     Asthma Neg Hx        Physical Examination      Vitals:  /87   Pulse 88   Temp 98.4 °F (36.9 °C) (Oral)   Resp 16   Wt 270 lb (122.5 kg)   SpO2 99%   BMI 36.62 kg/m²   Temp (24hrs), Av.4 °F (36.9 °C), Min:98 °F (36.7 °C), Max:98.8 °F (37.1 °C)      I/O (24Hr): Intake/Output Summary (Last 24 hours) at 2022 0848  Last data filed at 2022 0843  Gross per 24 hour   Intake 2364.12 ml   Output 1000 ml   Net 1364.12 ml         Physical Exam  Vitals reviewed. Constitutional:       General: He is not in acute distress. Appearance: Normal appearance.  He is not ill-appearing. HENT:      Head: Normocephalic and atraumatic. Right Ear: External ear normal.      Left Ear: External ear normal.      Nose: Nose normal.      Mouth/Throat:      Mouth: Mucous membranes are moist.      Pharynx: No oropharyngeal exudate or posterior oropharyngeal erythema. Comments: Laceration anterior aspect of tongue. Eyes:      Extraocular Movements: EOM normal.      Pupils: Pupils are equal, round, and reactive to light. Neurological:      Mental Status: He is alert and oriented to person, place, and time. Psychiatric:         Mood and Affect: Mood normal.         Speech: Speech normal.         Behavior: Behavior normal.       Neurologic Exam     Mental Status   Oriented to person, place, and time. Attention: normal. Concentration: normal.   Speech: speech is normal   Level of consciousness: alert  Normal comprehension. Cranial Nerves     CN II   Visual fields full to confrontation. Right visual field deficit: none  Left visual field deficit: none     CN III, IV, VI   Pupils are equal, round, and reactive to light. Extraocular motions are normal.   Right pupil: Shape: regular. Reactivity: brisk. Left pupil: Shape: regular. Reactivity: brisk. CN V   Facial sensation intact. Right facial sensation deficit: none  Left facial sensation deficit: none    CN VII   Facial expression full, symmetric.    Right facial weakness: none  Left facial weakness: none    CN VIII   CN VIII normal.   Hearing: intact    CN IX, X   CN IX normal.   CN X normal.   Palate: symmetric    CN XI   CN XI normal.   Right trapezius strength: normal  Left trapezius strength: normal    CN XII   CN XII normal.   Tongue: not atrophic  Fasciculations: absent  Tongue deviation: none    Motor Exam   Muscle bulk: normal  Overall muscle tone: normal  Right arm tone: normal  Left arm tone: normal  Right leg tone: normal  Left leg tone: normal  Muscle strength 5/5 in bilateral upper and lower extremities Sensory Exam   Light touch normal.        Labs/Imaging/Diagnostics   Labs:  CBC:  Recent Labs     04/27/22  0902   WBC 16.0*   RBC 5.66   HGB 15.1   HCT 46.7   MCV 82.5        CHEMISTRIES:  Recent Labs     04/27/22  0902 04/27/22  1757 04/28/22  0325     --  139   K 4.1  --  3.6     --  105   CO2 22*  --  19*   BUN 12  --  12   CREATININE 1.1  --  0.9   GLUCOSE 205*  --  175*   MG  --  2.0  --      PT/INR:No results for input(s): PROTIME, INR in the last 72 hours. APTT:No results for input(s): APTT in the last 72 hours. LIVER PROFILE:  Recent Labs     04/27/22 0902   AST 20   ALT 14   BILITOT 0.4   ALKPHOS 106     Imaging Last 24 Hours:  CT Head WO Contrast    Result Date: 4/27/2022  PROCEDURE: CT HEAD WO CONTRAST CLINICAL INFORMATION: seizures. COMPARISON: No prior study. TECHNIQUE: Noncontrast 5 mm axial images were obtained through the brain. Sagittal and coronal reconstructions were obtained. All CT scans at this facility use dose modulation, iterative reconstruction, and/or weight-based dosing when appropriate to reduce radiation dose to as low as reasonably achievable. FINDINGS: There is dural calcification. There is no other parenchymal abnormality. There is no hemorrhage. There are no intra-or extra-axial collections. There is no hydrocephalus, midline shift or mass effect. The gray-white matter differentiation is preserved. There is a tiny retention cyst or polyp in the left maxillary sinus. There is minimal mucosal thickening in the ethmoid air cells bilaterally. The remaining paranasal sinuses and mastoid air cells are normally aerated. There is no suspicious calvarial abnormality. 1. Negative noncontrast CT scan of the brain. . **This report has been created using voice recognition software. It may contain minor errors which are inherent in voice recognition technology. ** Final report electronically signed by DR Leanna English on 4/27/2022 10:17 AM    XR CHEST PORTABLE    Result Date: 4/27/2022  PROCEDURE: XR CHEST PORTABLE CLINICAL INFORMATION: seizures/falls COMPARISON: No prior study. TECHNIQUE: A single mobile view of the chest was obtained. Normal mobile chest. **This report has been created using voice recognition software. It may contain minor errors which are inherent in voice recognition technology. ** Final report electronically signed by Dr. Bae Both on 4/27/2022 9:02 AM    MRI BRAIN W WO CONTRAST    Result Date: 4/27/2022  MR brain with and without IV contrast. COMPARISON: CT of the head dated 4/27/2022 FINDINGS: No abnormal diffusion restriction in the brain parenchyma or extra-axial spaces. No evidence of mass, mass effect or midline shift. No intracranial hemorrhage or abnormal extra-axial fluid collection. No evidence of hydrocephalus. The basilar cisterns are patent. Cerebellar hemispheres and cerebellar vermis are normal. Fourth ventricle is normal. No brainstem abnormality is identified. Intracranial flow voids are patent. The visualized paranasal sinuses and mastoid air-cells are clear. No evidence of abnormal enhancement in the brain or meninges. 1. No acute intracranial findings. No enhancing mass or mass-effect. This document has been electronically signed by: Raeann Busch MD on 04/27/2022 05:17 PM        Assessment and Plan:          Seizures  CT head negative for acute findings  MRI with and without contrast negative. Keppra initiated in emergency department. 1 g was given. We will give the patient an additional 1500 mg at this time for an appropriate loading dose. Continue Keppra 1 g twice daily. EEG without epileptiform activity. Seizure precautions. IV Ativan as needed for any acute seizures. Please inform our team if any further seizures occur. No driving, climbing, swimming, or operating heavy machinery for 6 months or until cleared by neurologist.  This was discussed with the patient.   The patient can be discharged from a neurologic standpoint. He can follow-up with Dr. Javier Lawler for further management. This was discussed with Dr. Mandeep iLn and he is in agreement with the assessment and plan. Electronically signed by Lynne Pulliam PA-C on 4/28/22 at 12:16 PM EDT      I agree evaluation and plan.      Fidel Lezama MD

## 2022-04-28 NOTE — DISCHARGE SUMMARY
Hospitalist Discharge Summary        Patient: Luca Villanueva  YOB: 1970  MRN: [id number]   Acct: [de-identified]    Primary Care Physician: Alex Li date  4/27/2022    Discharge date: 4/28/2022 11:44 AM    Chief Complaint on presentation :-  Seizures    Discharge Assessment and Plan:-   · Seizure, acute: Neuro consulted. EEG negative. Pt was loaded with Keppra. Neuro recommended Keppra 1g as an OP and to follow with Dr. Alicea Letters as an OP. No driving, climbing, swimming, or operating heavy machinery for 6 months or until cleared by neurologist. MRI & CT head were negative. Pt did not have seizure while IP. · Acute respiratory failure, hypoxia: Resolved. · IDDMII: Resume DM regimen. Initial H and P and Hospital course:-  Initial H&P \"The patient is a 46 y.o. male with history of diabetes, meth amphetamine abuse, and SOURAV, presents with complaints of seizures. History is limited as the patient is post ictal and very drowsy. No family at bedside. The patient reportedly had 3 seizures last evening while he was sleeping. These events were witnessed by his girlfriend who reports that during these episodes, the patient becomes cyanotic, bites his tongue, and is incontinent of urine. There are associated headaches with spots in his vision. He had reportedly had an episode about a month ago and was seen at [facility] but it was felt to be drug related so he was discharged. Patient had seizure activity in the ER that was witnessed by nursing staff and lasted approximately 50 seconds. During this episode, the patient became hypoxic with saturations down to 56%. The patient has denied any recent alcohol or drug use. Patient denies any recent illnesses. \"     4/27: EEG negative. CT head negative. MRI negative. Neuro okay with DC. Pt was educated on seizure precautions and importance of medication compliance.  On the day of discharge, it was explained to the patient that it was very important to follow up with his PCP and Neuro to have continued care. Appointments were made and information was given. Physical Exam:-  Vitals:   Patient Vitals for the past 24 hrs:   BP Temp Temp src Pulse Resp SpO2   04/28/22 0813 139/87 98.4 °F (36.9 °C) Oral 88 16 99 %   04/28/22 0301 121/81 98.2 °F (36.8 °C) Oral 88 18 100 %   04/27/22 2306 (!) 153/97 98.6 °F (37 °C) Oral 92 20 100 %   04/27/22 2023 131/81 98.1 °F (36.7 °C) Oral 94 20 100 %   04/27/22 1418 (!) 149/94 98.8 °F (37.1 °C) Oral 97 20 100 %   04/27/22 1254 110/77 -- -- 106 21 98 %     Weight:   Weight: 270 lb (122.5 kg)   24 hour intake/output:     Intake/Output Summary (Last 24 hours) at 4/28/2022 1205  Last data filed at 4/28/2022 0843  Gross per 24 hour   Intake 1364.12 ml   Output 1000 ml   Net 364.12 ml       1. General appearance: No apparent distress, appears stated age and cooperative. 2. HEENT: Normal cephalic, atraumatic without obvious deformity. Pupils equal, round, and reactive to light. Extra ocular muscles intact. Conjunctivae/corneas clear. 3. Neck: Supple, with full range of motion. No jugular venous distention. Trachea midline. 4. Respiratory:  Normal respiratory effort. Clear to auscultation, bilaterally without Rales/Wheezes/Rhonchi. 5. Cardiovascular: Regular rate and rhythm with normal S1/S2 without murmurs, rubs or gallops. 6. Abdomen: Soft, non-tender, non-distended with normal bowel sounds. 7. Musculoskeletal:  No clubbing, cyanosis or edema bilaterally. 8. Skin: Skin color, texture, turgor normal.  No rashes or lesions. 9. Neurologic:  Neurovascularly intact without any focal sensory/motor deficits. Cranial nerves: II-XII intact, grossly non-focal.  10. Psychiatric: Alert and oriented, thought content appropriate, normal insight  11. Capillary Refill: Brisk,< 3 seconds   12.  Peripheral Pulses: +2 palpable, equal bilaterally       Discharge Medications:-      Medication List      START taking these medications levETIRAcetam 1000 MG tablet  Commonly known as: KEPPRA  Take 1 tablet by mouth 2 times daily  Notes to patient: Anti-seizure medication        CONTINUE taking these medications    lisinopril 2.5 MG tablet  Commonly known as: PRINIVIL;ZESTRIL     pioglitazone 15 MG tablet  Commonly known as: ACTOS           Where to Get Your Medications      These medications were sent to [facility] [address], [address], [address] [facility]    Phone: [phone]   · levETIRAcetam 1000 MG tablet          Labs :-  Recent Results (from the past 72 hour(s))   EKG 12 Lead    Collection Time: 04/27/22  8:25 AM   Result Value Ref Range    Ventricular Rate 95 BPM    Atrial Rate 95 BPM    P-R Interval 158 ms    QRS Duration 68 ms    Q-T Interval 344 ms    QTc Calculation (Bazett) 432 ms    P Axis 75 degrees    R Axis 98 degrees    T Axis 63 degrees   POCT Glucose    Collection Time: 04/27/22  8:49 AM   Result Value Ref Range    POC Glucose 201 (H) 70 - 108 mg/dl   CBC with Auto Differential    Collection Time: 04/27/22  9:02 AM   Result Value Ref Range    WBC 16.0 (H) 4.8 - 10.8 thou/mm3    RBC 5.66 4.70 - 6.10 mill/mm3    Hemoglobin 15.1 14.0 - 18.0 gm/dl    Hematocrit 46.7 42.0 - 52.0 %    MCV 82.5 80.0 - 94.0 fL    MCH 26.7 26.0 - 33.0 pg    MCHC 32.3 32.2 - 35.5 gm/dl    RDW-CV 12.6 11.5 - 14.5 %    RDW-SD 37.8 35.0 - 45.0 fL    Platelets 835 794 - 798 thou/mm3    MPV 8.9 (L) 9.4 - 12.4 fL    Seg Neutrophils 84.7 %    Lymphocytes 7.3 %    Monocytes 6.6 %    Eosinophils 0.9 %    Basophils 0.1 %    Immature Granulocytes 0.4 %    Segs Absolute 13.6 (H) 1.8 - 7.7 thou/mm3    Lymphocytes Absolute 1.2 1.0 - 4.8 thou/mm3    Monocytes Absolute 1.1 0.4 - 1.3 thou/mm3    Eosinophils Absolute 0.1 0.0 - 0.4 thou/mm3    Basophils Absolute 0.0 0.0 - 0.1 thou/mm3    Immature Grans (Abs) 0.06 0.00 - 0.07 thou/mm3    nRBC 0 /100 wbc   Comprehensive Metabolic Panel w/ Reflex to MG    Collection Time: 04/27/22  9:02 AM   Result Value Ref Range    Glucose 205 (H) 70 - 108 mg/dL    CREATININE 1.1 0.4 - 1.2 mg/dL    BUN 12 7 - 22 mg/dL    Sodium 138 135 - 145 meq/L    Potassium reflex Magnesium 4.1 3.5 - 5.2 meq/L    Chloride 102 98 - 111 meq/L    CO2 22 (L) 23 - 33 meq/L    Calcium 9.4 8.5 - 10.5 mg/dL    AST 20 5 - 40 U/L    Alkaline Phosphatase 106 38 - 126 U/L    Total Protein 7.0 6.1 - 8.0 g/dL    Albumin 4.1 3.5 - 5.1 g/dL    Total Bilirubin 0.4 0.3 - 1.2 mg/dL    ALT 14 11 - 66 U/L   Ammonia    Collection Time: 04/27/22  9:02 AM   Result Value Ref Range    Ammonia 16 11 - 60 umol/L   CK    Collection Time: 04/27/22  9:02 AM   Result Value Ref Range    Total  (H) 55 - 170 U/L   Troponin    Collection Time: 04/27/22  9:02 AM   Result Value Ref Range    Troponin T < 0.010 ng/ml   Brain Natriuretic Peptide    Collection Time: 04/27/22  9:02 AM   Result Value Ref Range    Pro-.7 0.0 - 900.0 pg/mL   Ethanol    Collection Time: 04/27/22  9:02 AM   Result Value Ref Range    ETHYL ALCOHOL, SERUM < 0.01 0.00 %   Anion Gap    Collection Time: 04/27/22  9:02 AM   Result Value Ref Range    Anion Gap 14.0 8.0 - 16.0 meq/L   Osmolality    Collection Time: 04/27/22  9:02 AM   Result Value Ref Range    Osmolality Calc 281.4 275.0 - 300.0 mOsmol/kg   Glomerular Filtration Rate, Estimated    Collection Time: 04/27/22  9:02 AM   Result Value Ref Range    Est, Glom Filt Rate 70 (A) ml/min/1.73m2   Urine Drug Screen    Collection Time: 04/27/22  2:15 PM   Result Value Ref Range    AMPHETAMINE+METHAMPHETAMINE URINE SCREEN Negative NEGATIVE    Barbiturate Quant, Ur Negative NEGATIVE    Benzodiazepine Quant, Ur Negative NEGATIVE    Cannabinoid Quant, Ur POSITIVE NEGATIVE    Cocaine Metab Quant, Ur Negative NEGATIVE    Opiates, Urine Negative NEGATIVE    Oxycodone Negative NEGATIVE    PCP Quant, Ur Negative NEGATIVE   Magnesium    Collection Time: 04/27/22  5:57 PM Result Value Ref Range    Magnesium 2.0 1.6 - 2.4 mg/dL   Basic Metabolic Panel w/ Reflex to MG    Collection Time: 04/28/22  3:25 AM   Result Value Ref Range    Sodium 139 135 - 145 meq/L    Potassium reflex Magnesium 3.6 3.5 - 5.2 meq/L    Chloride 105 98 - 111 meq/L    CO2 19 (L) 23 - 33 meq/L    Glucose 175 (H) 70 - 108 mg/dL    BUN 12 7 - 22 mg/dL    CREATININE 0.9 0.4 - 1.2 mg/dL    Calcium 9.1 8.5 - 10.5 mg/dL   Lactic acid, plasma    Collection Time: 04/28/22  3:25 AM   Result Value Ref Range    Lactic Acid 1.4 0.5 - 2.0 mmol/L   Anion Gap    Collection Time: 04/28/22  3:25 AM   Result Value Ref Range    Anion Gap 15.0 8.0 - 16.0 meq/L   Glomerular Filtration Rate, Estimated    Collection Time: 04/28/22  3:25 AM   Result Value Ref Range    Est, Glom Filt Rate 88 (A) ml/min/1.73m2        Microbiology:    Blood culture #1: No results found for: BC    Blood culture #2:No results found for: BLOODCULT2    Organism:    Lab Results   Component Value Date    LABGRAM  08/09/2016     No segmented neutrophils observed. No epithelial cells observed. No bacteria seen. MRSA culture only:No results found for: [facility]    Urine culture: No results found for: Darjaimee Eaves  No results found for: ORG     Respiratory culture: No results found for: CULTRESP    Aerobic and Anaerobic :  Lab Results   Component Value Date    LABAERO No growth-preliminary  No growth   08/09/2016     Lab Results   Component Value Date    LABANAE No growth-preliminary  No growth   08/09/2016       Urinalysis:      Lab Results   Component Value Date    NITRU NEGATIVE 07/19/2016    BLOODU NEGATIVE 07/19/2016    GLUCOSEU NEGATIVE 07/19/2016       Radiology:-  CT Head WO Contrast    Result Date: 4/27/2022  PROCEDURE: CT HEAD WO CONTRAST CLINICAL INFORMATION: seizures. COMPARISON: No prior study. TECHNIQUE: Noncontrast 5 mm axial images were obtained through the brain. Sagittal and coronal reconstructions were obtained.  All CT scans at this facility use dose modulation, iterative reconstruction, and/or weight-based dosing when appropriate to reduce radiation dose to as low as reasonably achievable. FINDINGS: There is dural calcification. There is no other parenchymal abnormality. There is no hemorrhage. There are no intra-or extra-axial collections. There is no hydrocephalus, midline shift or mass effect. The gray-white matter differentiation is preserved. There is a tiny retention cyst or polyp in the left maxillary sinus. There is minimal mucosal thickening in the ethmoid air cells bilaterally. The remaining paranasal sinuses and mastoid air cells are normally aerated. There is no suspicious calvarial abnormality. 1. Negative noncontrast CT scan of the brain. . **This report has been created using voice recognition software. It may contain minor errors which are inherent in voice recognition technology. ** Final report electronically signed by DR Rebeca Medina on 4/27/2022 10:17 AM    XR CHEST PORTABLE    Result Date: 4/27/2022  PROCEDURE: XR CHEST PORTABLE CLINICAL INFORMATION: seizures/falls COMPARISON: No prior study. TECHNIQUE: A single mobile view of the chest was obtained. Normal mobile chest. **This report has been created using voice recognition software. It may contain minor errors which are inherent in voice recognition technology. ** Final report electronically signed by Dr. Osito Haines on 4/27/2022 9:02 AM    MRI BRAIN W WO CONTRAST    Result Date: 4/27/2022  MR brain with and without IV contrast. COMPARISON: CT of the head dated 4/27/2022 FINDINGS: No abnormal diffusion restriction in the brain parenchyma or extra-axial spaces. No evidence of mass, mass effect or midline shift. No intracranial hemorrhage or abnormal extra-axial fluid collection. No evidence of hydrocephalus. The basilar cisterns are patent. Cerebellar hemispheres and cerebellar vermis are normal. Fourth ventricle is normal. No brainstem abnormality is identified. Intracranial flow voids are patent. The visualized paranasal sinuses and mastoid air-cells are clear. No evidence of abnormal enhancement in the brain or meninges. 1. No acute intracranial findings. No enhancing mass or mass-effect. This document has been electronically signed by:  Jere Bello MD on 04/27/2022 05:17 PM       Follow-up scheduled after discharge :-    in the next few days with Irena   in the next few weeks with Neurology     Consultations during this hospital stay:-  [] NONE [] Cardiology  [] Nephrology  [] Hemo onco   [] GI   [] ID  [] Endocrine  [] Pulm    [x] Neuro    [] Psych   [] Urology  [] ENT   [] G SURGERY   []Ortho    []CV surg    [] Palliative  [] Hospice [] Pain management   []    []TCU   [] PT/OT  OTHERS:-    Disposition: home  Condition at Discharge: Stable    Time Spent:- 45 minutes    Electronically signed by CESIA Mendoza on 4/28/22 at 12:05 PM EDT   Discharging Hospitalist

## 2022-04-28 NOTE — CARE COORDINATION
Late entry     04/28/22 1137   Service Assessment   Patient Orientation Alert and Oriented   History Provided By Patient;Significant Other   Support Systems Spouse/Significant Other   PCP Verified by CM Yes  (updated to current PCP)   Last Visit to PCP Within last 3 months   Prior Functional Level Independent in ADLs/IADLs   Current Functional Level Independent in ADLs/IADLs   Can patient return to prior living arrangement Yes   Ability to make needs known: Good   Family able to assist with home care needs: Yes   Social/Functional History   Lives With Significant other   Type of [address]   (CPAP & glucometer)   ADL Assistance Independent   Ambulation Assistance Independent   Active  Yes  (S.O. will provide transport while under seizure precautions)   Mode of Transportation Car   Discharge Planning   Type of Residence House   Living Arrangements Spouse/Significant Other   Current Services Prior To Admission C-pap   Potential Assistance Needed N/A   DME Ordered?  Other (comment)   Potential Assistance Purchasing Medications No   Type of Home Care Services None   Services At/After Discharge   Confirm Follow Up Transport Other (see comment)  (S.O.)

## 2022-04-28 NOTE — CARE COORDINATION
4/28/22, 7:39 AM EDT  DISCHARGE PLANNING EVALUATION:    Luis Enrique Kwok       Admitted: 4/27/2022/ [facility] day: 1   Location: [facility]06/006-A Reason for admit: Seizure ([facility] [address] [R56.9]   PMH:  has a past medical history of Arthritis, Diabetes mellitus ([address] and SOURAV on CPAP. Procedure:   4/27 EEG: No epileptiform discharges were identified. Please note the absence of such activity on this record cannot conclusively rule out an epileptic disorder. If such is still clinically suspected, a repeat study with sleep deprivation and/or prolonged sampling may be helpful. Barriers to Discharge:  Admitted 4/27 through ER for seizures. Reported several seizure episodes over the last month with VELASQUEZ. Had an episode in the ER witnessed by staff where SpO2 dropped to 56%. Received Keppra and Ativan in ER. Drug screen positive for cannabinoid. CT head was negative. MRI brain negative. On 2 L O2, SpO2 100%. . WBC 16. IVF. Keppra 1,000 mg PO BID (received 1500mg IV loading dose). PCP: Ayleen Kay MD  Readmission Risk Score: 4.5 ( )%    Patient Goals/Plan/Treatment Preferences: From home with S.O. Denies any needs, see flowsheet note for further information on assessment  Transportation/Food Security/Housekeeping Addressed:  No issues identified.

## 2022-06-01 ENCOUNTER — INITIAL CONSULT (OUTPATIENT)
Dept: NEUROLOGY | Age: 52
End: 2022-06-01
Payer: COMMERCIAL

## 2022-06-01 VITALS
SYSTOLIC BLOOD PRESSURE: 122 MMHG | HEART RATE: 82 BPM | BODY MASS INDEX: 36.3 KG/M2 | WEIGHT: 268 LBS | HEIGHT: 72 IN | DIASTOLIC BLOOD PRESSURE: 76 MMHG

## 2022-06-01 DIAGNOSIS — R56.9 NOCTURNAL SEIZURE (HCC): Primary | ICD-10-CM

## 2022-06-01 PROCEDURE — G8417 CALC BMI ABV UP PARAM F/U: HCPCS | Performed by: PSYCHIATRY & NEUROLOGY

## 2022-06-01 PROCEDURE — 3017F COLORECTAL CA SCREEN DOC REV: CPT | Performed by: PSYCHIATRY & NEUROLOGY

## 2022-06-01 PROCEDURE — 4004F PT TOBACCO SCREEN RCVD TLK: CPT | Performed by: PSYCHIATRY & NEUROLOGY

## 2022-06-01 PROCEDURE — 99205 OFFICE O/P NEW HI 60 MIN: CPT | Performed by: PSYCHIATRY & NEUROLOGY

## 2022-06-01 PROCEDURE — G8427 DOCREV CUR MEDS BY ELIG CLIN: HCPCS | Performed by: PSYCHIATRY & NEUROLOGY

## 2022-06-01 RX ORDER — INSULIN LISPRO 100 [IU]/ML
10 INJECTION, SOLUTION INTRAVENOUS; SUBCUTANEOUS
COMMUNITY
Start: 2020-10-01

## 2022-06-01 RX ORDER — DIVALPROEX SODIUM 500 MG/1
500 TABLET, EXTENDED RELEASE ORAL 2 TIMES DAILY
Qty: 60 TABLET | Refills: 3 | Status: SHIPPED | OUTPATIENT
Start: 2022-06-01 | End: 2022-07-15

## 2022-06-01 RX ORDER — DIAZEPAM 10 MG/2ML
GEL RECTAL
COMMUNITY
Start: 2022-05-17

## 2022-06-01 NOTE — PATIENT INSTRUCTIONS
1. Change Keppra to 500 mg two times a day  2. Start Depakote  mg two times a day  3. Depakote level in 10 days  4. Please call us in 2 weeks for further instructions on weaning down the Keppra. 5. Continue using your CPAP nightly  6. Report any new events. 7. Call with any new symptoms or concerns.    8. Follow up in 6 weeks

## 2022-06-12 NOTE — PROGRESS NOTES
Chief Complaint   Patient presents with    Consultation     seizure       Kimberly La is a 46 y.o. male who presents today for evaluation of 2 episodes of nocturnal seizure, one occurring in March 2022 and the other occurring in May 2022. The first episode he went to [facility] and was given narcan and responded immediately. His urine drug was positive at that time for amphetamines, but she states the patient has been using over-the-counter allergy medications. His significant other found him to be convulsing. He bit his tongue. No incontinence of bowel or bladder. MRI brain done W/WO contrast 4/27/22 showed No acute intracranial findings. No enhancing mass or mass-effect. EEG done 4/27/22 Normal awake and sleep EEG. His sleep is poor and interrupted, he wakes up feeling tired from sleep. He does have sleep apnea and wears his CPAP consistently. He is currently on keppra 1000 mg two times a day. Keppra level done 4/29/22=32. His significant other reports he is more irritable. No history of head injury. No family history of seizure. He denies any alcohol or illicit drugs. He denies chest pain. No shortness of breath, no neck pain. No vision changes. No dysphagia. No fever. No rash. No weight loss. History provided by patient accompanied by his significant other.        Past Medical History:   Diagnosis Date    Arthritis     hips, ankles, hands    Diabetes mellitus (HCC)     SOURAV on CPAP     Seasonal allergies     Seizures (HCC)        Patient Active Problem List   Diagnosis    Failed [facility] ([facility] [address]    Seizure ([facility]       No Known Allergies    Current Outpatient Medications   Medication Sig Dispense Refill    insulin lispro, 1 Unit Dial, 100 UNIT/ML SOPN Inject 10 Units into the skin 3 times daily (before meals)      diazePAM (DIASTAT) 10 MG GEL       levETIRAcetam (KEPPRA) 1000 MG tablet Take 1 tablet by mouth 2 times daily 60 tablet 3    lisinopril (PRINIVIL;ZESTRIL) 2.5 MG tablet Take 2.5 mg by mouth daily      pioglitazone (ACTOS) 15 MG tablet Take 30 mg by mouth daily        No current facility-administered medications for this visit. Social History     Socioeconomic History    Marital status:      Spouse name: None    Number of children: None    Years of education: None    Highest education level: None   Occupational History    None   Tobacco Use    Smoking status: Current Every Day Smoker     Packs/day: 1.00     Types: Cigarettes    Smokeless tobacco: Never Used   Vaping Use    Vaping Use: Some days    Substances: THC    Devices: Disposable, Pre-filled or refillable cartridge, Pre-filled pod   Substance and Sexual Activity    Alcohol use: Not Currently     Comment: ocassional    Drug use: Yes     Types: Marijuana Alena November)     Comment: daily    Sexual activity: Yes     Partners: Female   Other Topics Concern    None   Social History Narrative    None     Social Determinants of Health     Financial Resource Strain:     Difficulty of Paying Living Expenses: Not on file   Food Insecurity:     Worried About Running Out of Food in the Last Year: Not on file    Iban of Food in the Last Year: Not on file   Transportation Needs:     Lack of Transportation (Medical): Not on file    Lack of Transportation (Non-Medical):  Not on file   Physical Activity:     Days of Exercise per Week: Not on file    Minutes of Exercise per Session: Not on file   Stress:     Feeling of Stress : Not on file   Social Connections:     Frequency of Communication with Friends and Family: Not on file    Frequency of Social Gatherings with Friends and Family: Not on file    Attends Advent Services: Not on file    Active Member of Clubs or Organizations: Not on file    Attends Club or Organization Meetings: Not on file    Marital Status: Not on file   Intimate Partner Violence:     Fear of Current or Ex-Partner: Not on file    Emotionally Abused: Not on file    Physically Abused: Not on file    Sexually Abused: Not on file   Housing Stability:     Unable to Pay for Housing in the Last Year: Not on file    Number of Places Lived in the Last Year: Not on file    Unstable Housing in the Last Year: Not on file       Family History   Problem Relation Age of Onset    Diabetes Mother     Heart Disease Father     Heart Attack Father     Cancer Maternal Aunt         abdominal mass    Stroke Maternal Grandmother     No Known Problems Sister     No Known Problems Brother     No Known Problems Brother     Asthma Neg Hx          I reviewed the past medical history, allergies, medications, social history and family history. Review of Systems   All systems reviewed, and are all negative, except what is mentioned in HPI      Vitals:    06/01/22 1323   BP: 122/76   Site: Right Upper Arm   Position: Sitting   Cuff Size: Large Adult   Pulse: 82   Weight: 268 lb (121.6 kg)   Height: 6' (1.829 m)       Physical Examination:  General appearance - alert, well appearing, and in no distress, oriented to person, place, and time and over weight  Mental status- Level of Alertness: awake  Orientation: person, place, time  Memory: normal  Fund of Knowledge: normal  Attention/Concentration: normal  Language: normal. Mood is normal.   Neck - supple, no significant adenopathy, carotids upstroke normal bilaterally. There is no axillary lymphadenopathy. There is no carotid bruit. No neck lymphadenopathy.  No thyroid enlargement   Neurological -   Cranial [id number]:.   Cranial nerve II: Normal   Cranial nerve III: Pupils: equal, round, reactive to light  Cranial nerves III, IV, VI: Extraocular Movements: intact   Cranial nerve V: Facial sensation: intact   Cranial nerve VII:Facial strength: intact   Cranial nerve VIII: Hearing: intact   Cranial nerve IX: Palate Elevation intact bilaterally  Cranial nerve XI: Shoulder shrug intact bilaterally  Cranial nerve XII: Tongue midline   neck supple without rigidity, there is no limitation of range of motion of the neck. DTR's are Intact distal and symmetric  Babinski sign negative  Motor exam is 5/5 in the upper and lower extremities. Normal muscle tone. There is no muscle atrophy. Sensory is intact for light touch  Coordination: finger to nose,  intact  Gait and station intact   Abnormal movement none, vibration normal, proprioception normal  Skin - warm, dry to touch, normal coloration, no rashes, no suspicious skin lesions  Superficial temporal artery pulses are normal.   There is no limitation of range of motion of the neck. There is no resting tremor, no pin rolling, no bradykinesia, no Hypohonia, normal blink rate. Musculoskeletal: Has no hand arthritis, no limitation of ROM in any of the four extremities. There is no leg edema. The Heart was regular in rate and rhythm. No heart murmur  Chest Clear, with  good effort. Abdomen soft, intact bowel sounds. MRI BRAIN W WO CONTRAST  (reviewed)    Narrative  MR brain with and without IV contrast.    COMPARISON: CT of the head dated 4/27/2022    FINDINGS:  No abnormal diffusion restriction in the brain parenchyma or extra-axial  spaces. No evidence of mass, mass effect or midline shift. No intracranial hemorrhage or abnormal extra-axial fluid collection. No evidence of hydrocephalus. The basilar cisterns are patent. Cerebellar hemispheres and cerebellar vermis are normal. Fourth ventricle  is normal.  No brainstem abnormality is identified. Intracranial flow voids are patent. The visualized paranasal sinuses and mastoid air-cells are clear. No evidence of abnormal enhancement in the brain or meninges. Impression  1. No acute intracranial findings. No enhancing mass or mass-effect. This document has been electronically signed by:  Christopher Toussaint MD on  04/27/2022 05:17 PM        CT Head WO Contrast    Narrative  PROCEDURE: CT HEAD WO CONTRAST    CLINICAL INFORMATION: seizures. COMPARISON: No prior study. TECHNIQUE: Noncontrast 5 mm axial images were obtained through the brain. Sagittal and coronal reconstructions were obtained. All CT scans at this facility use dose modulation, iterative reconstruction, and/or weight-based dosing when appropriate to reduce radiation dose to as low as reasonably achievable. FINDINGS:    There is dural calcification. There is no other parenchymal abnormality. There is no hemorrhage. There are no intra-or extra-axial collections. There is no hydrocephalus, midline shift or mass effect. The gray-white matter differentiation is preserved. There is a tiny retention cyst or polyp in the left maxillary sinus. There is minimal mucosal thickening in the ethmoid air cells bilaterally. The remaining paranasal sinuses and mastoid air cells are normally aerated. There is no suspicious calvarial  abnormality. Impression  1. Negative noncontrast CT scan of the brain. .  **This report has been created using voice recognition software. It may contain minor errors which are inherent in voice recognition technology. **    Final report electronically signed by DR Yury Hua on 4/27/2022 10:17 AM    4/29/22 0850     Levetiracetam 10 - 40 ug/mL 32        We reviewed the patient records from referring provider and available information in the EHR       ASSESSMENT:      Diagnosis Orders   1. Nocturnal seizure [facility])        This is a 17-year-old male who presents with 2 episodes of nocturnal seizure 1 occurring in March 2022 and the other occurring in May 2022. The first episode he went to [facility] and was given narcan and responded immediately. His urine drug was positive at that time for amphetamines, but she states the patient has been using over-the-counter allergy medications. His significant other found him to be convulsing. He bit his tongue. No incontinence of bowel or bladder.   His exam is nonfocal. MRI brain done W/WO contrast

## 2022-06-22 ENCOUNTER — TELEPHONE (OUTPATIENT)
Dept: NEUROLOGY | Age: 52
End: 2022-06-22

## 2022-06-22 DIAGNOSIS — R56.9 NOCTURNAL SEIZURE (HCC): Primary | ICD-10-CM
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
## 2022-06-23 RX ORDER — DIVALPROEX SODIUM 500 MG/1
TABLET, EXTENDED RELEASE ORAL
Qty: 60 TABLET | Refills: 3 | OUTPATIENT
Start: 2022-06-23

## 2022-06-23 NOTE — TELEPHONE ENCOUNTER
Spoke with Santino Clemens, per patient request, who stated patient has not missed any doses of the Depakote  mg twice daily. Teena Guerrier patient's medication intake. Santino Clemens is also asking about further instructions on weaning down the [address]. Please advise. Thank you.

## 2022-06-23 NOTE — TELEPHONE ENCOUNTER
Please ask patient to increase Depakote ER to 500 mg in the morning and 1000 mg nightly. Please repeat Depakote level in 10 days. Take consistently. Continue with current dose of keppra for now. We will hold off on further reducing keppra for now as Depakote level is not yet therapeutic.   Kale Steward, CNP

## 2022-07-15 ENCOUNTER — OFFICE VISIT (OUTPATIENT)
Dept: NEUROLOGY | Age: 52
End: 2022-07-15
Payer: COMMERCIAL

## 2022-07-15 VITALS
SYSTOLIC BLOOD PRESSURE: 122 MMHG | DIASTOLIC BLOOD PRESSURE: 84 MMHG | HEART RATE: 93 BPM | WEIGHT: 268 LBS | RESPIRATION RATE: 17 BRPM | OXYGEN SATURATION: 98 % | BODY MASS INDEX: 36.3 KG/M2 | HEIGHT: 72 IN

## 2022-07-15 DIAGNOSIS — R56.9 NOCTURNAL SEIZURE (HCC): Primary | ICD-10-CM

## 2022-07-15 PROCEDURE — 99213 OFFICE O/P EST LOW 20 MIN: CPT | Performed by: PSYCHIATRY & NEUROLOGY

## 2022-07-15 RX ORDER — DIVALPROEX SODIUM 500 MG/1
1500 TABLET, EXTENDED RELEASE ORAL DAILY
Qty: 90 TABLET | Refills: 5 | Status: SHIPPED | OUTPATIENT
Start: 2022-07-15 | End: 2022-09-09

## 2022-07-15 NOTE — PROGRESS NOTES
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 He is doing well. No reported seizure. He is here to go over plan. No Known Allergies    Current Outpatient Medications:     diazePAM (DIASTAT) 10 MG GEL, , Disp: , Rfl:     divalproex (DEPAKOTE ER) 500 MG extended release tablet, Take 1 tablet by mouth in the morning and at bedtime, Disp: 60 tablet, Rfl: 3    insulin lispro, 1 Unit Dial, 100 UNIT/ML SOPN, Inject 10 Units into the skin 3 times daily (before meals), Disp: , Rfl:     levETIRAcetam (KEPPRA) 1000 MG tablet, Take 1 tablet by mouth 2 times daily, Disp: 60 tablet, Rfl: 3    lisinopril (PRINIVIL;ZESTRIL) 2.5 MG tablet, Take 2.5 mg by mouth daily, Disp: , Rfl:     pioglitazone (ACTOS) 15 MG tablet, Take 30 mg by mouth daily , Disp: , Rfl:     I reviewed the past medical history, allergies, medications, social history and family history. PE:   Vitals:    07/15/22 1503   BP: 122/84   Pulse: 93   Resp: 17   SpO2: 98%   Weight: 268 lb (121.6 kg)   Height: 6' (1.829 m)     General Appearance:  alert, cooperative, and obese  Gen: NAD, Language is Intact. Skin: no rash, lesion,  moist to touch. warm  Head: no rash, no icterus  Neck: There is no carotid bruits. The Neck is supple. There is no neck lymphadenopathy. Neuro: CN 2-12 grossly intact with no focal deficits. Power 5/5 Throughout symmetric, Reflexes are symmetric. Long tracts are intact. Cerebellar exam is Intact. Sensory exam is intact to light touch. Gait is limping intact. Musculoskeletal:  Has no hand arthritis, no limitation of ROM in any of the four extremities.    Lower extremities no edema          DATA:      Results for orders placed or performed during the hospital encounter of 04/27/22   CBC with Auto Differential   Result Value Ref Range    WBC 16.0 (H) 4.8 - 10.8 thou/mm3    RBC 5.66 4.70 - 6.10 mill/mm3    Hemoglobin 15.1 14.0 - 18.0 gm/dl    Hematocrit 46.7 42.0 - 52.0 %    MCV 82.5 80.0 - 94.0 fL    MCH 26.7 26.0 - 33.0 pg    MCHC 32.3 32.2 - 35.5 gm/dl    RDW-CV 12.6 11.5 - 14.5 %    RDW-SD 37.8 35.0 - 45.0 fL    Platelets 516 299 - 436 thou/mm3    MPV 8.9 (L) 9.4 - 12.4 fL    Seg Neutrophils 84.7 %    Lymphocytes 7.3 %    Monocytes 6.6 %    Eosinophils 0.9 %    Basophils 0.1 %    Immature Granulocytes 0.4 %    Segs Absolute 13.6 (H) 1.8 - 7.7 thou/mm3    Lymphocytes Absolute 1.2 1.0 - 4.8 thou/mm3    Monocytes Absolute 1.1 0.4 - 1.3 thou/mm3    Eosinophils Absolute 0.1 0.0 - 0.4 thou/mm3    Basophils Absolute 0.0 0.0 - 0.1 thou/mm3    Immature Grans (Abs) 0.06 0.00 - 0.07 thou/mm3    nRBC 0 /100 wbc   Comprehensive Metabolic Panel w/ Reflex to MG   Result Value Ref Range    Glucose 205 (H) 70 - 108 mg/dL    CREATININE 1.1 0.4 - 1.2 mg/dL    BUN 12 7 - 22 mg/dL    Sodium 138 135 - 145 meq/L    Potassium reflex Magnesium 4.1 3.5 - 5.2 meq/L    Chloride 102 98 - 111 meq/L    CO2 22 (L) 23 - 33 meq/L    Calcium 9.4 8.5 - 10.5 mg/dL    AST 20 5 - 40 U/L    Alkaline Phosphatase 106 38 - 126 U/L    Total Protein 7.0 6.1 - 8.0 g/dL    Albumin 4.1 3.5 - 5.1 g/dL    Total Bilirubin 0.4 0.3 - 1.2 mg/dL    ALT 14 11 - 66 U/L   Ammonia   Result Value Ref Range    Ammonia 16 11 - 60 umol/L   CK   Result Value Ref Range    Total  (H) 55 - 170 U/L   Troponin   Result Value Ref Range    Troponin T < 0.010 ng/ml   Brain Natriuretic Peptide   Result Value Ref Range    Pro-.7 0.0 - 900.0 pg/mL   Ethanol   Result Value Ref Range    ETHYL ALCOHOL, SERUM < 0.01 0.00 %   Anion Gap   Result Value Ref Range    Anion Gap 14.0 8.0 - 16.0 meq/L   Osmolality   Result Value Ref Range    Osmolality Calc 281.4 275.0 - 300.0 mOsmol/kg   Glomerular Filtration Rate, Estimated   Result Value Ref Range    Est, Glom Filt Rate 70 (A) ml/min/1.73m2   Urine Drug Screen   Result Value Ref Range AMPHETAMINE+METHAMPHETAMINE URINE SCREEN Negative NEGATIVE    Barbiturate Quant, Ur Negative NEGATIVE    Benzodiazepine Quant, Ur Negative NEGATIVE    Cannabinoid Quant, Ur POSITIVE NEGATIVE    Cocaine Metab Quant, Ur Negative NEGATIVE    Opiates, Urine Negative NEGATIVE    Oxycodone Negative NEGATIVE    PCP Quant, Ur Negative NEGATIVE   Magnesium   Result Value Ref Range    Magnesium 2.0 1.6 - 2.4 mg/dL   Basic Metabolic Panel w/ Reflex to MG   Result Value Ref Range    Sodium 139 135 - 145 meq/L    Potassium reflex Magnesium 3.6 3.5 - 5.2 meq/L    Chloride 105 98 - 111 meq/L    CO2 19 (L) 23 - 33 meq/L    Glucose 175 (H) 70 - 108 mg/dL    BUN 12 7 - 22 mg/dL    CREATININE 0.9 0.4 - 1.2 mg/dL    Calcium 9.1 8.5 - 10.5 mg/dL   Lactic acid, plasma   Result Value Ref Range    Lactic Acid 1.4 0.5 - 2.0 mmol/L   Anion Gap   Result Value Ref Range    Anion Gap 15.0 8.0 - 16.0 meq/L   Glomerular Filtration Rate, Estimated   Result Value Ref Range    Est, Glom Filt Rate 88 (A) ml/min/1.73m2   POCT Glucose   Result Value Ref Range    POC Glucose 201 (H) 70 - 108 mg/dl   EKG 12 Lead   Result Value Ref Range    Ventricular Rate 95 BPM    Atrial Rate 95 BPM    P-R Interval 158 ms    QRS Duration 68 ms    Q-T Interval 344 ms    QTc Calculation (Bazett) 432 ms    P Axis 75 degrees    R Axis 98 degrees    T Axis 63 degrees              MRI BRAIN W WO CONTRAST    Narrative  MR brain with and without IV contrast.    COMPARISON: CT of the head dated 4/27/2022    FINDINGS:  No abnormal diffusion restriction in the brain parenchyma or extra-axial  spaces. No evidence of mass, mass effect or midline shift. No intracranial hemorrhage or abnormal extra-axial fluid collection. No evidence of hydrocephalus. The basilar cisterns are patent. Cerebellar hemispheres and cerebellar vermis are normal. Fourth ventricle  is normal.  No brainstem abnormality is identified. Intracranial flow voids are patent.   The visualized paranasal sinuses and mastoid air-cells are clear. No evidence of abnormal enhancement in the brain or meninges. Impression  1. No acute intracranial findings. No enhancing mass or mass-effect. This document has been electronically signed by: Laxmi Santos MD on  04/27/2022 05:17 PM      CT Head WO Contrast    Narrative  PROCEDURE: CT HEAD WO CONTRAST    CLINICAL INFORMATION: seizures. COMPARISON: No prior study. TECHNIQUE: Noncontrast 5 mm axial images were obtained through the brain. Sagittal and coronal reconstructions were obtained. All CT scans at this facility use dose modulation, iterative reconstruction, and/or weight-based dosing when appropriate to reduce radiation dose to as low as reasonably achievable. FINDINGS:    There is dural calcification. There is no other parenchymal abnormality. There is no hemorrhage. There are no intra-or extra-axial collections. There is no hydrocephalus, midline shift or mass effect. The gray-white matter differentiation is preserved. There is a tiny retention cyst or polyp in the left maxillary sinus. There is minimal mucosal thickening in the ethmoid air cells bilaterally. The remaining paranasal sinuses and mastoid air cells are normally aerated. There is no suspicious calvarial  abnormality. Impression  1. Negative noncontrast CT scan of the brain. .          **This report has been created using voice recognition software. It may contain minor errors which are inherent in voice recognition technology. **    Final report electronically signed by DR Hussain Corona on 4/27/2022 10:17 AM         Assessment:     Diagnosis Orders   1. Nocturnal seizure ([facility] [address]             Follow up for seizure. he reports no seizure. He is on Depakote  mg twice a day. Depakote level most recent was 58.5 on 7/5/2022 on 500 mg in am and 1000 mg at night time, (1500 mg daily). He is compliant. Denies side effects of spells.  He is dealing with shoulder and arm pain, to be

## 2022-07-15 NOTE — PATIENT INSTRUCTIONS
Continue with Depakote  mg in am and 1000 mg at night time. Report any new events. Call with any new symptoms or concerns. Follow up in 4 months.

## 2022-08-15 ENCOUNTER — TELEPHONE (OUTPATIENT)
Dept: NEUROLOGY | Age: 52
End: 2022-08-15

## 2022-08-15 DIAGNOSIS — R56.9 NOCTURNAL SEIZURE (HCC): Primary | ICD-10-CM

## 2022-08-15 NOTE — TELEPHONE ENCOUNTER
Gigi Hoover, on HIPAA, called stating patient had breakthrough seizure yesterday morning. Patient was evaluated at [facility] ED. Depakote level was 58.5 yesterday. Depakote dose was increased to Depakote ER 2000 mg daily. Instructed no driving, swimming, operating heavy machinery, or compromising heights until event free for 6 months. Gigi Hoover verbalized understanding. Gigi Hoover calling with update. Please advise. Thank you.

## 2022-08-26 LAB — VALPROIC ACID LEVEL: 70 G/DL (ref 50–100)

## 2022-08-27 LAB
AVERAGE GLUCOSE: 135 MG/DL (ref 70–126)
HBA1C MFR BLD: 6.5 % (ref 4.4–6.4)

## 2022-08-29 ENCOUNTER — TELEPHONE (OUTPATIENT)
Dept: NEUROLOGY | Age: 52
End: 2022-08-29

## 2022-08-29 NOTE — TELEPHONE ENCOUNTER
----- Message from Myrtle Camargo MD sent at 8/28/2022 10:34 PM EDT -----  Please let patient Know Depakote level result was within normal range=70     Myrtle Camargo MD

## 2022-08-29 NOTE — TELEPHONE ENCOUNTER
Patient notified and verbalized understanding. Patient stated he is taking Depakote ER 2000 mg daily for Nocturnal seizure. Last seizure on 8/14/22 where patient was seen at [facility] ED. Patient feels Depakote is causing bilateral hand tremors, drowsiness and excessive eating. He has gained 20 pounds over the past 3 weeks. He has tried Keppra in the past but did not tolerate as it made him irritable. Next follow up on 11/14/22. Please advise. Thank you.

## 2022-09-09 ENCOUNTER — OFFICE VISIT (OUTPATIENT)
Dept: NEUROLOGY | Age: 52
End: 2022-09-09
Payer: COMMERCIAL

## 2022-09-09 VITALS
OXYGEN SATURATION: 99 % | SYSTOLIC BLOOD PRESSURE: 134 MMHG | WEIGHT: 303 LBS | HEART RATE: 86 BPM | DIASTOLIC BLOOD PRESSURE: 86 MMHG | BODY MASS INDEX: 41.04 KG/M2 | HEIGHT: 72 IN

## 2022-09-09 DIAGNOSIS — R56.9 NOCTURNAL SEIZURE (HCC): Primary | ICD-10-CM

## 2022-09-09 PROCEDURE — 99214 OFFICE O/P EST MOD 30 MIN: CPT | Performed by: PSYCHIATRY & NEUROLOGY

## 2022-09-09 PROCEDURE — 3017F COLORECTAL CA SCREEN DOC REV: CPT | Performed by: PSYCHIATRY & NEUROLOGY

## 2022-09-09 PROCEDURE — G8417 CALC BMI ABV UP PARAM F/U: HCPCS | Performed by: PSYCHIATRY & NEUROLOGY

## 2022-09-09 PROCEDURE — G8427 DOCREV CUR MEDS BY ELIG CLIN: HCPCS | Performed by: PSYCHIATRY & NEUROLOGY

## 2022-09-09 PROCEDURE — 4004F PT TOBACCO SCREEN RCVD TLK: CPT | Performed by: PSYCHIATRY & NEUROLOGY

## 2022-09-09 RX ORDER — ZONISAMIDE 100 MG/1
CAPSULE ORAL
Qty: 90 CAPSULE | Refills: 3 | Status: SHIPPED | OUTPATIENT
Start: 2022-09-09

## 2022-09-09 RX ORDER — PEN NEEDLE, DIABETIC 32GX 5/32"
NEEDLE, DISPOSABLE MISCELLANEOUS
COMMUNITY
Start: 2022-08-18

## 2022-09-09 RX ORDER — INSULIN GLARGINE 100 [IU]/ML
INJECTION, SOLUTION SUBCUTANEOUS
COMMUNITY
Start: 2022-06-18

## 2022-09-09 NOTE — PROGRESS NOTES
NEUROLOGY OUT PATIENT FOLLOW UP NOTE:  9/9/20229:41 AM    Bob Sosa is here for follow up for   Patient Active Problem List   Diagnosis    Failed total hip arthroplasty ([facility] [address]    Seizure ([facility] [address]        Follow up for seizure. He is doing well. No reported seizure. He is here to go over plan. No Known Allergies    Current Outpatient Medications:     BD PEN NEEDLE JOE 2ND GEN 32G X 4 MM MISC, USE 1 (ONE) EACH FOUR TIMES A DAY USING AFTER MEALS W HUMALOG AND DAILY LANTUS, Disp: , Rfl:     LANTUS SOLOSTAR 100 UNIT/ML injection pen, , Disp: , Rfl:     divalproex (DEPAKOTE ER) 500 MG extended release tablet, Take 3 tablets by mouth in the morning., Disp: 90 tablet, Rfl: 5    insulin lispro, 1 Unit Dial, 100 UNIT/ML SOPN, Inject 10 Units into the skin 3 times daily (before meals), Disp: , Rfl:     diazePAM (DIASTAT) 10 MG GEL, , Disp: , Rfl:     lisinopril (PRINIVIL;ZESTRIL) 2.5 MG tablet, Take 2.5 mg by mouth daily, Disp: , Rfl:     pioglitazone (ACTOS) 15 MG tablet, Take 30 mg by mouth daily , Disp: , Rfl:     I reviewed the past medical history, allergies, medications, social history and family history. PE:   Vitals:    09/09/22 0919   BP: 134/86   Site: Left Upper Arm   Position: Sitting   Cuff Size: Large Adult   Pulse: 86   SpO2: 99%   Weight: (!) 303 lb (137.4 kg)   Height: 6' (1.829 m)     General Appearance:  alert, cooperative, and obese  Gen: NAD, Language is Intact. Skin: no rash, lesion,  moist to touch. warm  Head: no rash, no icterus  Neck: There is no carotid bruits. The Neck is supple. There is no neck lymphadenopathy. Neuro: CN 2-12 grossly intact with no focal deficits. Power 5/5 Throughout symmetric, Reflexes are symmetric. Long tracts are intact. Cerebellar exam is Intact. Sensory exam is intact to light touch. Gait is limping intact. Musculoskeletal:  Has no hand arthritis, no limitation of ROM in any of the four extremities.    Lower extremities no edema          DATA:      Results for orders placed or performed in visit on 07/15/22   Hemoglobin A1C   Result Value Ref Range    Hemoglobin A1C 6.5 (H) 4.4 - 6.4 %    AVERAGE GLUCOSE 135 (H) 70 - 126 mg/dL   Valproic Acid (Depakote)   Result Value Ref Range    Valproic Acid Lvl 70.0 50.0 - 100.0 g/dL              MRI BRAIN W WO CONTRAST    Narrative  MR brain with and without IV contrast.    COMPARISON: CT of the head dated 4/27/2022    FINDINGS:  No abnormal diffusion restriction in the brain parenchyma or extra-axial  spaces. No evidence of mass, mass effect or midline shift. No intracranial hemorrhage or abnormal extra-axial fluid collection. No evidence of hydrocephalus. The basilar cisterns are patent. Cerebellar hemispheres and cerebellar vermis are normal. Fourth ventricle  is normal.  No brainstem abnormality is identified. Intracranial flow voids are patent. The visualized paranasal sinuses and mastoid air-cells are clear. No evidence of abnormal enhancement in the brain or meninges. Impression  1. No acute intracranial findings. No enhancing mass or mass-effect. This document has been electronically signed by: Ankur Barr MD on  04/27/2022 05:17 PM      CT Head WO Contrast    Narrative  PROCEDURE: CT HEAD WO CONTRAST    CLINICAL INFORMATION: seizures. COMPARISON: No prior study. TECHNIQUE: Noncontrast 5 mm axial images were obtained through the brain. Sagittal and coronal reconstructions were obtained. All CT scans at this facility use dose modulation, iterative reconstruction, and/or weight-based dosing when appropriate to reduce radiation dose to as low as reasonably achievable. FINDINGS:    There is dural calcification. There is no other parenchymal abnormality. There is no hemorrhage. There are no intra-or extra-axial collections. There is no hydrocephalus, midline shift or mass effect. The gray-white matter differentiation is preserved.     There is a tiny retention cyst or polyp in the left maxillary

## 2022-09-09 NOTE — PATIENT INSTRUCTIONS
Start Zonegran 100 mg daily for one week, then 200 mg daily for one week, then 300 mg daily thereafter. Wean off Depakote ER by 500 mg weekly as discussed until discontinued. Referral made to sleep specialist for sleep apnea. Report any new events. Call with any new symptoms or concerns. Follow up in 4 weeks.

## 2022-09-10 DIAGNOSIS — R56.9 NOCTURNAL SEIZURE (HCC): Primary | ICD-10-CM

## 2022-09-13 RX ORDER — DIVALPROEX SODIUM 500 MG/1
TABLET, EXTENDED RELEASE ORAL
Qty: 24 TABLET | Refills: 0 | Status: SHIPPED | OUTPATIENT
Start: 2022-09-13 | End: 2022-09-26 | Stop reason: SDUPTHER

## 2022-09-26 DIAGNOSIS — R56.9 NOCTURNAL SEIZURE (HCC): ICD-10-CM

## 2022-09-26 RX ORDER — DIVALPROEX SODIUM 500 MG/1
TABLET, EXTENDED RELEASE ORAL
Qty: 120 TABLET | Refills: 1 | Status: SHIPPED | OUTPATIENT
Start: 2022-09-26 | End: 2022-10-13

## 2022-09-26 NOTE — TELEPHONE ENCOUNTER
Patient scheduled for 10/3/22. Patient notified and verbalized understanding. Instructed no driving, swimming, operating heavy machinery, or compromising heights until event free 6 months. Patient verbalized understanding.  [facility]

## 2022-09-26 NOTE — TELEPHONE ENCOUNTER
Patient sent [employer] message stating he had seizure on Friday. Per girlfriend, was sitting in garage when he started stuttering. Patient left side of face was drooping. He started having shaking and spitting. He fell out of his chair and hit his head. Episode lasted 2-3 minutes per girlfriend. Patient was not waking up after the seizure per girlfriend so she called EMG. Patient was transported to [facility] ED. Patient was confused for 30 minutes after seizure. CT at [facility] ED was ok per girlfriend. Per girlfriend, patient had just decreased his Depakote dose to 500 mg the day prior to seizure as instructed at last visit to wean off Depakote. [facility] ED increased Depakote back to 1000 mg nightly. Patient is also taking Zonegran 300 mg daily. Next follow up on 10/10/22. Please advise. Thank you.

## 2022-09-26 NOTE — TELEPHONE ENCOUNTER
Please ask patient to restart the Depakote ER at same prior dosage of 2000 mg ER daily for now, follow up with us this week, we can also do VV. To discuss plan.    Myrtle Camargo MD

## 2022-10-01 DIAGNOSIS — R56.9 NOCTURNAL SEIZURE (HCC): ICD-10-CM

## 2022-10-03 ENCOUNTER — OFFICE VISIT (OUTPATIENT)
Dept: NEUROLOGY | Age: 52
End: 2022-10-03
Payer: COMMERCIAL

## 2022-10-03 VITALS
WEIGHT: 302 LBS | OXYGEN SATURATION: 99 % | BODY MASS INDEX: 40.9 KG/M2 | HEART RATE: 75 BPM | DIASTOLIC BLOOD PRESSURE: 78 MMHG | SYSTOLIC BLOOD PRESSURE: 120 MMHG | HEIGHT: 72 IN

## 2022-10-03 DIAGNOSIS — R56.9 SEIZURE (HCC): Primary | ICD-10-CM

## 2022-10-03 PROCEDURE — G8484 FLU IMMUNIZE NO ADMIN: HCPCS | Performed by: NURSE PRACTITIONER

## 2022-10-03 PROCEDURE — 3017F COLORECTAL CA SCREEN DOC REV: CPT | Performed by: NURSE PRACTITIONER

## 2022-10-03 PROCEDURE — G8417 CALC BMI ABV UP PARAM F/U: HCPCS | Performed by: NURSE PRACTITIONER

## 2022-10-03 PROCEDURE — 4004F PT TOBACCO SCREEN RCVD TLK: CPT | Performed by: NURSE PRACTITIONER

## 2022-10-03 PROCEDURE — 99213 OFFICE O/P EST LOW 20 MIN: CPT | Performed by: NURSE PRACTITIONER

## 2022-10-03 PROCEDURE — G8428 CUR MEDS NOT DOCUMENT: HCPCS | Performed by: NURSE PRACTITIONER

## 2022-10-03 RX ORDER — LACOSAMIDE 50 MG/1
50 TABLET ORAL 2 TIMES DAILY
Qty: 60 TABLET | Refills: 3 | Status: SHIPPED | OUTPATIENT
Start: 2022-10-03 | End: 2022-10-06 | Stop reason: SDUPTHER

## 2022-10-03 RX ORDER — ZONISAMIDE 100 MG/1
CAPSULE ORAL
Qty: 90 CAPSULE | Refills: 3 | OUTPATIENT
Start: 2022-10-03

## 2022-10-03 NOTE — PROGRESS NOTES
NEUROLOGY OUT PATIENT FOLLOW UP NOTE:  10/3/056075:20 AM    Marilin Lara is here for follow up for seizure. ROS:  Respiratory : no cough, no shortness of breath  Cardiac: no chest pain. No palpitations. Renal : no flank pain, no hematuria    Skin: no rash      No Known Allergies    Current Outpatient Medications:     divalproex (DEPAKOTE ER) 500 MG extended release tablet, Take 2000 mg by mouth daily (Patient taking differently: 1,000 mg Take 2000 mg by mouth daily), Disp: 120 tablet, Rfl: 1    BD PEN NEEDLE JOE 2ND GEN 32G X 4 MM MISC, USE 1 (ONE) EACH FOUR TIMES A DAY USING AFTER MEALS W HUMALOG AND DAILY LANTUS, Disp: , Rfl:     LANTUS SOLOSTAR 100 UNIT/ML injection pen, , Disp: , Rfl:     zonisamide (ZONEGRAN) 100 MG capsule, Take 100 mg capsule daily for one week, then 200 mg daily for one week, then 300 mg daily thereafter. Take with plenty of fluids. , Disp: 90 capsule, Rfl: 3    insulin lispro, 1 Unit Dial, 100 UNIT/ML SOPN, Inject 10 Units into the skin 3 times daily (before meals), Disp: , Rfl:     diazePAM (DIASTAT) 10 MG GEL, , Disp: , Rfl:     lisinopril (PRINIVIL;ZESTRIL) 2.5 MG tablet, Take 2.5 mg by mouth daily, Disp: , Rfl:     pioglitazone (ACTOS) 15 MG tablet, Take 30 mg by mouth daily  (Patient not taking: Reported on 10/3/2022), Disp: , Rfl:     I reviewed the past medical history, allergies, medications, social history and family history. PE:   Vitals:    10/03/22 1113   BP: 120/78   Site: Left Upper Arm   Position: Sitting   Cuff Size: Large Adult   Pulse: 75   SpO2: 99%   Weight: (!) 302 lb (137 kg)   Height: 6' (1.829 m)     General Appearance:  awake, alert, oriented, in no acute distress, he is wearing sun glasses  Gen: NAD, Language is Intact. Skin: no rash, lesion, dry  to touch. warm  Head: no rash, no icterus  Neck: There is no carotid bruits. The Neck is supple. There is no neck lymphadenopathy. Neuro: CN 2-12 grossly intact with no focal deficits.  Power 5/5 Throughout symmetric, Reflexes are  symmetric. Long tracts are intact. Cerebellar exam is Intact. Sensory exam is intact to light touch. Gait is intact. Musculoskeletal:  Has no hand arthritis, no limitation of ROM in any of the four extremities. Lower extremities no edema          DATA:        Results for orders placed or performed in visit on 07/15/22   Hemoglobin A1C   Result Value Ref Range    Hemoglobin A1C 6.5 (H) 4.4 - 6.4 %    AVERAGE GLUCOSE 135 (H) 70 - 126 mg/dL   Valproic Acid (Depakote)   Result Value Ref Range    Valproic Acid Lvl 70.0 50.0 - 100.0 g/dL          No results found for this or any previous visit. No results found for this or any previous visit. No results found for this or any previous visit. No results found for this or any previous visit. No results found for this or any previous visit. Results for orders placed during the hospital encounter of 04/27/22    MRI BRAIN W WO CONTRAST    Narrative  MR brain with and without IV contrast.    COMPARISON: CT of the head dated 4/27/2022    FINDINGS:  No abnormal diffusion restriction in the brain parenchyma or extra-axial  spaces. No evidence of mass, mass effect or midline shift. No intracranial hemorrhage or abnormal extra-axial fluid collection. No evidence of hydrocephalus. The basilar cisterns are patent. Cerebellar hemispheres and cerebellar vermis are normal. Fourth ventricle  is normal.  No brainstem abnormality is identified. Intracranial flow voids are patent. The visualized paranasal sinuses and mastoid air-cells are clear. No evidence of abnormal enhancement in the brain or meninges. Impression  1. No acute intracranial findings. No enhancing mass or mass-effect. This document has been electronically signed by: Doron Rivers MD on  04/27/2022 05:17 PM    No results found for this or any previous visit.     Results for orders placed during the hospital encounter of 04/27/22    CT Head WO Contrast    Narrative  PROCEDURE: CT HEAD WO CONTRAST    CLINICAL INFORMATION: seizures. COMPARISON: No prior study. TECHNIQUE: Noncontrast 5 mm axial images were obtained through the brain. Sagittal and coronal reconstructions were obtained. All CT scans at this facility use dose modulation, iterative reconstruction, and/or weight-based dosing when appropriate to reduce radiation dose to as low as reasonably achievable. FINDINGS:    There is dural calcification. There is no other parenchymal abnormality. There is no hemorrhage. There are no intra-or extra-axial collections. There is no hydrocephalus, midline shift or mass effect. The gray-white matter differentiation is preserved. There is a tiny retention cyst or polyp in the left maxillary sinus. There is minimal mucosal thickening in the ethmoid air cells bilaterally. The remaining paranasal sinuses and mastoid air cells are normally aerated. There is no suspicious calvarial  abnormality. Impression  1. Negative noncontrast CT scan of the brain. .          **This report has been created using voice recognition software. It may contain minor errors which are inherent in voice recognition technology. **    Final report electronically signed by DR Milta Severe on 4/27/2022 10:17 AM     EEG done 4/27/22:  Impression  Normal awake and sleep EEG. EKG lead did not show clear arrhythmia, if still in concern consider formal EKG or correlation with telemetry. No epileptiform discharges were identified. Please note the absence of such activity on this record cannot conclusively rule out an epileptic disorder. If such is still clinically suspected, a repeat study with sleep deprivation and/or prolonged sampling may be helpful. Ambar Le MD  Epilepsy Board Certified. Neurology Board Certified. Electronically Signed    Assessment:     Diagnosis Orders   1. Seizure ([address]             Follow up for seizure.  He reports having seizure on 9/23/22 during the day. He attempted to wean off the Depakote and start zonegran. He is currently on Depakote 500 mg two times a day in addition to zonegran 200 mg two times a day (this dose was recommended by his PCP). He reports his sleep pattern is off and weight is up since starting the Depakote. This is his first seizure during the daytime, all of his other seizures have occurred at night. He has also been on keppra, however this was stopped due to mood. He is pending evaluation with sleep medicine. We will continue him on zonegran at current dose and start him on vimpat. We gave him instructions to wean off the Depakote due to side effects. He would also benefit from formal evaluation with epilepsy specialist, for possible epilepsy monitoring. After detailed discussion with patient and his wife  we agreed on the following plan. Plan:  Continue with Zonegran at current dose. Start Vimpat 50 mg two times a day. Change Depakote to 500 mg in the morning for 1 week, then change to 250 mg daily for 1 week , then stop  No driving, swimming, operating heavy machinery or compromising heights until cleared. Report any new events. Call if any questions. Referral to epilepsy specialist, Amanda Tsai,  at [facility]. [facility] re: epilepsy monitoring  Follow up after evaluation with epilepsy specialist.   Call if any questions or concerns.     Total time 26 min    Jory Crisostomo, MADELYN - CNP

## 2022-10-03 NOTE — PATIENT INSTRUCTIONS
Continue with Zonegran at current dose. Start Vimpat 50 mg two times a day. Change Depakote to 500 mg in the morning for 1 week, then change to 250 mg daily for 1 week , then stop  No driving, swimming, operating heavy machinery or compromising heights until cleared. Report any new events. Call if any questions. Referral to epilepsy specialist, Evelyn Tsai,  at [facility]. [facility] re: epilepsy monitoring  Follow up after evaluation with epilepsy specialist.   Call if any questions or concerns.

## 2022-10-05 ENCOUNTER — HOSPITAL ENCOUNTER (EMERGENCY)
Age: 52
Discharge: HOME | End: 2022-10-05
Attending: EMERGENCY MEDICINE
Payer: COMMERCIAL

## 2022-10-05 VITALS
HEART RATE: 69 BPM | DIASTOLIC BLOOD PRESSURE: 107 MMHG | RESPIRATION RATE: 20 BRPM | OXYGEN SATURATION: 100 % | BODY MASS INDEX: 40.96 KG/M2 | SYSTOLIC BLOOD PRESSURE: 156 MMHG | WEIGHT: 302 LBS | TEMPERATURE: 97.7 F

## 2022-10-05 DIAGNOSIS — R56.9 SEIZURE (HCC): ICD-10-CM

## 2022-10-05 DIAGNOSIS — G40.909 SEIZURE DISORDER (HCC): ICD-10-CM

## 2022-10-05 DIAGNOSIS — G40.919 BREAKTHROUGH SEIZURE (HCC): Primary | ICD-10-CM

## 2022-10-05 LAB
ALBUMIN SERPL-MCNC: 3.9 G/DL (ref 3.5–5.1)
ALP BLD-CCNC: 89 U/L (ref 38–126)
ALT SERPL-CCNC: 18 U/L (ref 11–66)
ANION GAP SERPL CALCULATED.3IONS-SCNC: 17 MEQ/L (ref 8–16)
APTT: 27.9 SECONDS (ref 22–38)
AST SERPL-CCNC: 19 U/L (ref 5–40)
BASOPHILS # BLD: 0.6 %
BASOPHILS ABSOLUTE: 0.1 THOU/MM3 (ref 0–0.1)
BILIRUB SERPL-MCNC: 0.3 MG/DL (ref 0.3–1.2)
BUN BLDV-MCNC: 16 MG/DL (ref 7–22)
CALCIUM SERPL-MCNC: 9.3 MG/DL (ref 8.5–10.5)
CHLORIDE BLD-SCNC: 107 MEQ/L (ref 98–111)
CO2: 16 MEQ/L (ref 23–33)
CREAT SERPL-MCNC: 1 MG/DL (ref 0.4–1.2)
EKG ATRIAL RATE: 105 BPM
EKG P AXIS: 72 DEGREES
EKG P-R INTERVAL: 156 MS
EKG Q-T INTERVAL: 332 MS
EKG QRS DURATION: 66 MS
EKG QTC CALCULATION (BAZETT): 438 MS
EKG R AXIS: 102 DEGREES
EKG T AXIS: 38 DEGREES
EKG VENTRICULAR RATE: 105 BPM
EOSINOPHIL # BLD: 2.2 %
EOSINOPHILS ABSOLUTE: 0.2 THOU/MM3 (ref 0–0.4)
ERYTHROCYTE [DISTWIDTH] IN BLOOD BY AUTOMATED COUNT: 12.8 % (ref 11.5–14.5)
ERYTHROCYTE [DISTWIDTH] IN BLOOD BY AUTOMATED COUNT: 39.2 FL (ref 35–45)
GFR SERPL CREATININE-BSD FRML MDRD: > 90 ML/MIN/1.73M2
GLUCOSE BLD-MCNC: 193 MG/DL (ref 70–108)
HCT VFR BLD CALC: 47.8 % (ref 42–52)
HEMOGLOBIN: 15.7 GM/DL (ref 14–18)
IMMATURE GRANS (ABS): 0.06 THOU/MM3 (ref 0–0.07)
IMMATURE GRANULOCYTES: 0.7 %
INR BLD: 0.95 (ref 0.85–1.13)
LYMPHOCYTES # BLD: 16.7 %
LYMPHOCYTES ABSOLUTE: 1.4 THOU/MM3 (ref 1–4.8)
MCH RBC QN AUTO: 27.6 PG (ref 26–33)
MCHC RBC AUTO-ENTMCNC: 32.8 GM/DL (ref 32.2–35.5)
MCV RBC AUTO: 84.2 FL (ref 80–94)
MONOCYTES # BLD: 6.4 %
MONOCYTES ABSOLUTE: 0.5 THOU/MM3 (ref 0.4–1.3)
NUCLEATED RED BLOOD CELLS: 0 /100 WBC
OSMOLALITY CALCULATION: 285.8 MOSMOL/KG (ref 275–300)
PLATELET # BLD: 220 THOU/MM3 (ref 130–400)
PMV BLD AUTO: 10 FL (ref 9.4–12.4)
POTASSIUM SERPL-SCNC: 4.4 MEQ/L (ref 3.5–5.2)
RBC # BLD: 5.68 MILL/MM3 (ref 4.7–6.1)
SEG NEUTROPHILS: 73.4 %
SEGMENTED NEUTROPHILS ABSOLUTE COUNT: 6.2 THOU/MM3 (ref 1.8–7.7)
SODIUM BLD-SCNC: 140 MEQ/L (ref 135–145)
TOTAL PROTEIN: 6.8 G/DL (ref 6.1–8)
WBC # BLD: 8.5 THOU/MM3 (ref 4.8–10.8)

## 2022-10-05 PROCEDURE — 85025 COMPLETE CBC W/AUTO DIFF WBC: CPT

## 2022-10-05 PROCEDURE — 85730 THROMBOPLASTIN TIME PARTIAL: CPT

## 2022-10-05 PROCEDURE — 93005 ELECTROCARDIOGRAM TRACING: CPT | Performed by: EMERGENCY MEDICINE

## 2022-10-05 PROCEDURE — 99284 EMERGENCY DEPT VISIT MOD MDM: CPT

## 2022-10-05 PROCEDURE — 80053 COMPREHEN METABOLIC PANEL: CPT

## 2022-10-05 PROCEDURE — 85610 PROTHROMBIN TIME: CPT

## 2022-10-05 PROCEDURE — 93010 ELECTROCARDIOGRAM REPORT: CPT | Performed by: INTERNAL MEDICINE

## 2022-10-05 PROCEDURE — 6370000000 HC RX 637 (ALT 250 FOR IP): Performed by: EMERGENCY MEDICINE

## 2022-10-05 RX ORDER — LACOSAMIDE 50 MG/1
50 TABLET ORAL 2 TIMES DAILY
Qty: 30 TABLET | Refills: 0 | Status: SHIPPED | OUTPATIENT
Start: 2022-10-05 | End: 2022-10-06

## 2022-10-05 RX ORDER — LACOSAMIDE 50 MG/1
50 TABLET ORAL ONCE
Status: COMPLETED | OUTPATIENT
Start: 2022-10-05 | End: 2022-10-05

## 2022-10-05 RX ADMIN — LACOSAMIDE 50 MG: 50 TABLET, FILM COATED ORAL at 15:21

## 2022-10-05 RX ADMIN — LACOSAMIDE 50 MG: 50 TABLET, FILM COATED ORAL at 17:37

## 2022-10-05 ASSESSMENT — PAIN - FUNCTIONAL ASSESSMENT: PAIN_FUNCTIONAL_ASSESSMENT: NONE - DENIES PAIN

## 2022-10-05 ASSESSMENT — ENCOUNTER SYMPTOMS
CHEST TIGHTNESS: 0
BACK PAIN: 0
RHINORRHEA: 0
VOMITING: 0
VOICE CHANGE: 0
NAUSEA: 0
WHEEZING: 0
TROUBLE SWALLOWING: 0
COUGH: 0
CONSTIPATION: 0
ABDOMINAL PAIN: 0
DIARRHEA: 0
SORE THROAT: 0
SHORTNESS OF BREATH: 0
SINUS PRESSURE: 0

## 2022-10-05 NOTE — ED PROVIDER NOTES
[facility]        Room # 11/169J    CHIEF COMPLAINT    Chief Complaint   Patient presents with    Seizures       Nurses Notes reviewed and I agree except as noted in the HPI. HPI    Darryle Pastor is a 46 y.o. male who presents for evaluation of seizures at 12:30 this morning. The patient came in here together with the live-in girlfriend states that the patient about 12:30 noon time was noted to have some foamy in his mouth and also on his shirt with accompanying headache. Patient had the same episode in the past and usually from the seizures. The patient does not recall what happened to him however he was diagnosed with seizures since March 2022 by his neurologist Dr. Maia Crigler. He has been on several medications Zonegran and Depakote at present however Depakote is giving him a lot of side effects including eating constantly, and gaining a lot of weight, his diabetes uncontrolled, patient had problem with sleep pattern is also awake all night long. Patient had a follow-up with Dr. Maia Crigler 2 days ago and was prescribed lacosamide however patient has not started yet because of insurance issues. Wean off Depakote however patient had not started doing weaning because he does not have the Lacosamide due to Insurances issue. REVIEW OF SYSTEMS    Review of Systems   Constitutional:  Negative for appetite change, chills, diaphoresis, fatigue and fever. HENT:  Negative for congestion, ear discharge, ear pain, postnasal drip, rhinorrhea, sinus pressure, sore throat, trouble swallowing and voice change. Respiratory:  Negative for cough, chest tightness, shortness of breath and wheezing. Cardiovascular:  Negative for chest pain, palpitations and leg swelling. Gastrointestinal:  Negative for abdominal pain, constipation, diarrhea, nausea and vomiting.    Musculoskeletal:  Negative for arthralgias, back pain, joint swelling, myalgias, neck pain and neck stiffness. Skin:  Negative for rash. Neurological:  Positive for seizures. Negative for dizziness, syncope, weakness, light-headedness, numbness and headaches. PAST MEDICAL HISTORY     has a past medical history of Arthritis, Diabetes mellitus ([facility] [address] SOURAV on CPAP, Seasonal allergies, and Seizures ([facility] [address] SURGICAL HISTORY   has a past surgical history that includes joint replacement (Right, ); Ankle Fusion (Left, ); and Cholecystectomy. CURRENT MEDICATIONS    Discharge Medication List as of 10/5/2022  4:55 PM        CONTINUE these medications which have NOT CHANGED    Details   !! lacosamide (VIMPAT) 50 MG TABS tablet Take 1 tablet by mouth 2 times daily for 123 days. , Disp-60 tablet, R-3Normal      divalproex (DEPAKOTE ER) 500 MG extended release tablet Take 2000 mg by mouth daily, Disp-120 tablet, R-1Normal      BD PEN NEEDLE JOE 2ND GEN 32G X 4 MM MISC USE 1 (ONE) EACH FOUR TIMES A DAY USING AFTER MEALS W HUMALOG AND DAILY LANTUS, DAWHistorical Med      LANTUS SOLOSTAR 100 UNIT/ML injection pen DAWHistorical Med      zonisamide (ZONEGRAN) 100 MG capsule Take 100 mg capsule daily for one week, then 200 mg daily for one week, then 300 mg daily thereafter. Take with plenty of fluids. , Disp-90 capsule, R-3Normal      insulin lispro, 1 Unit Dial, 100 UNIT/ML SOPN Inject 10 Units into the skin 3 times daily (before meals)Historical Med      diazePAM (DIASTAT) 10 MG GEL Historical Med      lisinopril (PRINIVIL;ZESTRIL) 2.5 MG tablet Take 2.5 mg by mouth dailyHistorical Med      pioglitazone (ACTOS) 15 MG tablet Take 30 mg by mouth daily Historical Med       !! - Potential duplicate medications found. Please discuss with provider. ALLERGIES    has No Known Allergies. FAMILY HISTORY    He indicated that his mother is alive. He indicated that his father is . He indicated that his sister is alive. He indicated that both of his brothers are alive. He indicated that the status of his maternal grandmother is unknown. He indicated that the status of his maternal aunt is unknown. He indicated that the status of his neg hx is unknown.   family history includes Cancer in his maternal aunt; Diabetes in his mother; Heart Attack in his father; Heart Disease in his father; No Known Problems in his brother, brother, and sister; Stroke in his maternal grandmother. SOCIAL HISTORY     reports that he has been smoking cigarettes. He has been smoking an average of 1 pack per day. He has never used smokeless tobacco. He reports that he does not currently use alcohol. He reports current drug use. Drug: Marijuana [facility]). PHYSICAL EXAM      INITIAL VITALS: BP (!) 156/107   Pulse 69   Temp 97.7 °F (36.5 °C) (Oral)   Resp 20   Wt (!) 302 lb (137 kg)   SpO2 100%   BMI 40.96 kg/m² Estimated body mass index is 40.96 kg/m² as calculated from the following:    Height as of 10/3/22: 6' (1.829 m). Weight as of this encounter: 302 lb (137 kg). Physical Exam  Vitals reviewed. Constitutional:       Appearance: He is well-developed. HENT:      Head: Normocephalic and atraumatic. Right Ear: External ear normal.      Left Ear: External ear normal.      Nose: Nose normal.   Eyes:      General: No scleral icterus. Conjunctiva/sclera: Conjunctivae normal.      Pupils: Pupils are equal, round, and reactive to light. Neck:      Thyroid: No thyromegaly. Vascular: No JVD. Cardiovascular:      Rate and Rhythm: Normal rate and regular rhythm. Heart sounds: No murmur heard. No friction rub. Pulmonary:      Effort: Pulmonary effort is normal.      Breath sounds: Normal breath sounds. No wheezing or rales. Chest:      Chest wall: No tenderness. Abdominal:      General: Bowel sounds are normal.      Palpations: Abdomen is soft. There is no mass. Tenderness: There is no abdominal tenderness.    Musculoskeletal:      Cervical back: Normal range of motion and neck supple. Lymphadenopathy:      Cervical: No cervical adenopathy. Skin:     Findings: No rash. Neurological:      Mental Status: He is alert and oriented to person, place, and time. Psychiatric:         Behavior: Behavior is cooperative. MEDICAL DECISION MAKING    DIFFERENTIAL DIAGNOSIS:  Break through seizure, seizure controlled      DIAGNOSTIC RESULTS    EKG   Interpreted by Izabella Alfaro MD      Rhythm: sinus tachycardia  Rate: tachycardia with rate of 105  Axis: normal  Ectopy: none  Conduction: normal  ST Segments: no acute change  T Waves: no acute change  Q Waves: none    Clinical Impression: sinus tachycardia, right axis deviation, borderline EKG      Izabella Alfaro MD      RADIOLOGY:    No orders to display       LABS:   Labs Reviewed   COMPREHENSIVE METABOLIC PANEL - Abnormal; Notable for the following components:       Result Value    Glucose 193 (*)     CO2 16 (*)     All other components within normal limits   ANION GAP - Abnormal; Notable for the following components:    Anion Gap 17.0 (*)     All other components within normal limits   CBC WITH AUTO DIFFERENTIAL   PROTIME-INR   APTT   OSMOLALITY   GLOMERULAR FILTRATION RATE, ESTIMATED     All other unresulted laboratory test above are normal:    Vitals:    Vitals:    10/05/22 1346 10/05/22 1446 10/05/22 1516 10/05/22 1546   BP: (!) 143/94 138/83 125/68 (!) 156/107   Pulse: 97 80 70 69   Resp: 20 21 19 20   Temp: 97.7 °F (36.5 °C)      TempSrc: Oral      SpO2: 97% 97% 98% 100%   Weight: (!) 302 lb (137 kg)          EMERGENCY DEPARTMENT COURSE:    Medications   lacosamide (VIMPAT) tablet 50 mg (50 mg Oral Given 10/5/22 1521)   lacosamide (VIMPAT) tablet 50 mg (50 mg Oral Given 10/5/22 1737)       The pt was seen and evaluated by me. Within the department, I observed the pt's vitalsigns to be within acceptable range. Laboratory and Radiological studies were performed, results were reviewed with the patient.  Within the department, the pt was treated with Lacosamide 50 mg and one tablet to go. I tried to get his medication through [facility], but patient is not qualified. . I observed the pt's condition to be hemodynamically stable during the duration of their stay. I explained my proposed course of treatment to the pt, and they were amenable to my decision. They were discharged home, and they will return to the ED if their symptoms become more severein nature, or otherwise change. Controlled Substances Monitoring:        CRITICAL CARE:   None. CONSULTS:  None    PROCEDURES:  None. FINAL IMPRESSION       1. Breakthrough seizure ([address]    2. Seizure disorder ([address]    3. Seizure ([facility] [address]          DISPOSITION/PLAN  PATIENT REFERRED TO:  Vikas Savage, [address] [phone]    Schedule an appointment as soon as possible for a visit in 1 week      [address] [place] [address] EMERGENCY DEPT  [address] [address]  [phone]    As needed    [facility] [address] [place] [address]  [phone]    Schedule an appointment as soon as possible for a visit in 1 week    DISCHARGE MEDICATIONS:  Discharge Medication List as of 10/5/2022  4:55 PM        START taking these medications    Details   !! lacosamide (VIMPAT) 50 MG TABS tablet Take 1 tablet by mouth 2 times daily for 15 days. , Disp-30 tablet, R-0Print       !! - Potential duplicate medications found. Please discuss with provider. (Please note that portions of this note were completed with a voice recognition program and electronically transcribed. Efforts were [facility] edit the dictations but occasionally words are mis-transcribed . The transcription may contain errors not detected in proofreading.   This transcription was electronically signed.)     10/09/22 7:38 AM      Marcela Zuniga MD        Emergency room physician             Marcela Zuniga MD  10/09/22 0727

## 2022-10-05 NOTE — ED TRIAGE NOTES
Pt to ED c/o seizure earlier. Pt states he has history of seizures and is to start on a new medication for seizures but has not gotten insurance approval yet. Pt denies any pain. Pt seizure was not witnessed but pt SO states she came in the room after and pt was disoriented and had foamed at the mouth. Pt alert and oriented. EKG complete. Monitor applied.

## 2022-10-05 NOTE — ED NOTES
Pt medicated per MAR. Pt tolerated well. Respirations unlabored. Updated on POC.       Angela KLEIN RN  10/05/22 0795

## 2022-10-06 ENCOUNTER — TELEPHONE (OUTPATIENT)
Dept: NEUROLOGY | Age: 52
End: 2022-10-06

## 2022-10-06 DIAGNOSIS — G40.909 SEIZURE DISORDER (HCC): ICD-10-CM

## 2022-10-06 DIAGNOSIS — R56.9 SEIZURE (HCC): ICD-10-CM

## 2022-10-06 DIAGNOSIS — G40.919 BREAKTHROUGH SEIZURE (HCC): ICD-10-CM

## 2022-10-06 RX ORDER — LACOSAMIDE 50 MG/1
50 TABLET ORAL 2 TIMES DAILY
Qty: 60 TABLET | Refills: 3 | Status: SHIPPED | OUTPATIENT
Start: 2022-10-06 | End: 2022-10-07 | Stop reason: SDUPTHER

## 2022-10-06 NOTE — TELEPHONE ENCOUNTER
Spoke with Vimpat drug rep who stated since medication is now generic, [facility] is no longer providing assistance for Vimpat. Spoke with Mady Bell, on HIPAA, who stated she spoke with insurance company who informed  her it can take up to 2 days for prior auth update. Mady Bell was able to  a couple of days worth of Vimpat using goodRx coupon. Mady Bell will contact Dr Isi Rivera office to schedule as well. Mady Bell requesting new script be sent to [facility] as previous script was cancelled yesterday, verified with pharmacy need new script.

## 2022-10-06 NOTE — TELEPHONE ENCOUNTER
Does he meet criteria for Vimpat patient assistance? Or where are we at with the prior auth process.   Marisa Peguero, CNP

## 2022-10-07 DIAGNOSIS — R56.9 SEIZURE (HCC): Primary | ICD-10-CM

## 2022-10-07 RX ORDER — LACOSAMIDE 50 MG/1
50 TABLET ORAL 2 TIMES DAILY
Qty: 60 TABLET | Refills: 3 | Status: SHIPPED | OUTPATIENT
Start: 2022-10-07 | End: 2022-10-07 | Stop reason: SDUPTHER

## 2022-10-07 RX ORDER — LACOSAMIDE 50 MG/1
50 TABLET ORAL 2 TIMES DAILY
Qty: 60 TABLET | Refills: 2 | Status: SHIPPED | OUTPATIENT
Start: 2022-10-07 | End: 2023-02-07

## 2022-10-07 NOTE — TELEPHONE ENCOUNTER
Vimpat refilled.  Referral placed to [facility] epilepsy specialist.  Kia Marrero, CNP

## 2022-10-07 NOTE — TELEPHONE ENCOUNTER
Mariella Rowell (on HIPAA) called office back stating Jhon does not have vimpat on hand and will not be able to get any in until Monday or Tuesday. Patient will be out of medication on Saturday. Verified with [facility] on [facility] that they have Vimpat 50 mg in stock. Medication pended.

## 2022-10-07 NOTE — TELEPHONE ENCOUNTER
Received Vimpat prior auth denial stating:     Patient has diagnosis of seizure. Expedited appeal attempted over the phone and was informed must be faxed. Expedited appeal faxed to  with Ada Huff who stated patient is not able to schedule with Dr Moreno Body due to their office is not scheduling at this time. Ada Huff requesting referral be sent to [facility] neurology at this time. Ada Huff also requesting Vimpat be sent to [facility] for cost savings with Good until Vimpat can be approved by insurance.

## 2022-10-12 ENCOUNTER — TELEPHONE (OUTPATIENT)
Dept: NEUROLOGY | Age: 52
End: 2022-10-12

## 2022-10-12 DIAGNOSIS — G40.919 BREAKTHROUGH SEIZURE (HCC): ICD-10-CM

## 2022-10-12 DIAGNOSIS — R56.9 SEIZURE (HCC): Primary | ICD-10-CM

## 2022-10-12 DIAGNOSIS — R56.9 NOCTURNAL SEIZURE (HCC): ICD-10-CM

## 2022-10-12 DIAGNOSIS — G40.909 SEIZURE DISORDER (HCC): ICD-10-CM

## 2022-10-12 NOTE — TELEPHONE ENCOUNTER
Received a request to schedule the patient for LTME monitoring. Call placed to the patient and a message left on his VM asking for a return call.

## 2022-10-12 NOTE — TELEPHONE ENCOUNTER
Dr Cuevas Mom office calling asking for order for LTME/EEG video  monitoring as referral stated Dr. Kimo Connors for epilepsy monitoring [facility] in [place]. Please advise. Thank you.

## 2022-10-21 DIAGNOSIS — R56.9 NOCTURNAL SEIZURE (HCC): ICD-10-CM

## 2022-10-21 RX ORDER — DIVALPROEX SODIUM 500 MG/1
TABLET, EXTENDED RELEASE ORAL
Qty: 120 TABLET | Refills: 1 | OUTPATIENT
Start: 2022-10-21

## 2022-10-24 NOTE — TELEPHONE ENCOUNTER
Good afternoon Mohit Smith, we received a message that Ravinder Reyes had testing at [facility]. Caller stated that he had an EEG. I'm not sure if this was long term video EEG or a routine EEG. Would you like to proceed with the LTME monitoring or should this be cancelled. Please advise.

## 2022-10-24 NOTE — TELEPHONE ENCOUNTER
Unknown female caller left a voicemail on the clinic line stating that Tamy Gill was seen at [facility] for an EEG. During the recording Tamy Gill had a seizure, while being un medicated. She stated on the message that he was diagnosed as being epileptic. \"He was placed on medication and given a regimen of things to do\". No other information was listed on the call.

## 2023-11-01 ENCOUNTER — NURSE ONLY (OUTPATIENT)
Age: 53
End: 2023-11-01

## 2023-11-01 LAB
ALBUMIN SERPL BCG-MCNC: 4.1 G/DL (ref 3.5–5.1)
ALP SERPL-CCNC: 115 U/L (ref 38–126)
ALT SERPL W/O P-5'-P-CCNC: 12 U/L (ref 11–66)
ANION GAP SERPL CALC-SCNC: 14 MEQ/L (ref 8–16)
AST SERPL-CCNC: 12 U/L (ref 5–40)
BILIRUB SERPL-MCNC: 0.3 MG/DL (ref 0.3–1.2)
BUN SERPL-MCNC: 12 MG/DL (ref 7–22)
CALCIUM SERPL-MCNC: 9.3 MG/DL (ref 8.5–10.5)
CHLORIDE SERPL-SCNC: 105 MEQ/L (ref 98–111)
CHOLEST SERPL-MCNC: 212 MG/DL (ref 100–199)
CO2 SERPL-SCNC: 19 MEQ/L (ref 23–33)
CREAT SERPL-MCNC: 0.9 MG/DL (ref 0.4–1.2)
CREAT UR-MCNC: 137.2 MG/DL
GFR SERPL CREATININE-BSD FRML MDRD: > 60 ML/MIN/1.73M2
GLUCOSE SERPL-MCNC: 159 MG/DL (ref 70–108)
HDLC SERPL-MCNC: 42 MG/DL
LDLC SERPL CALC-MCNC: 137 MG/DL
MICROALBUMIN UR-MCNC: < 1.2 MG/DL
MICROALBUMIN/CREAT RATIO PNL UR: 9 MG/G (ref 0–30)
POTASSIUM SERPL-SCNC: 4.2 MEQ/L (ref 3.5–5.2)
PROT SERPL-MCNC: 7.2 G/DL (ref 6.1–8)
SODIUM SERPL-SCNC: 138 MEQ/L (ref 135–145)
TRIGL SERPL-MCNC: 166 MG/DL (ref 0–199)

## 2023-11-02 LAB
DEPRECATED MEAN GLUCOSE BLD GHB EST-ACNC: 174 MG/DL (ref 70–126)
HBA1C MFR BLD HPLC: 7.8 % (ref 4.4–6.4)

## 2024-02-14 ENCOUNTER — NURSE ONLY (OUTPATIENT)
Age: 54
End: 2024-02-14

## 2024-02-17 LAB — LACOSAMIDE SERPL-MCNC: 11.2 UG/ML (ref 1–10)

## 2024-04-24 ENCOUNTER — NURSE ONLY (OUTPATIENT)
Age: 54
End: 2024-04-24

## 2024-04-24 LAB
ALBUMIN SERPL BCG-MCNC: 4.1 G/DL (ref 3.5–5.1)
ALP SERPL-CCNC: 129 U/L (ref 38–126)
ALT SERPL W/O P-5'-P-CCNC: 11 U/L (ref 11–66)
ANION GAP SERPL CALC-SCNC: 12 MEQ/L (ref 8–16)
AST SERPL-CCNC: 12 U/L (ref 5–40)
BASOPHILS ABSOLUTE: 0.1 THOU/MM3 (ref 0–0.1)
BASOPHILS NFR BLD AUTO: 0.6 %
BILIRUB SERPL-MCNC: 0.3 MG/DL (ref 0.3–1.2)
BUN SERPL-MCNC: 12 MG/DL (ref 7–22)
CALCIUM SERPL-MCNC: 9.4 MG/DL (ref 8.5–10.5)
CHLORIDE SERPL-SCNC: 106 MEQ/L (ref 98–111)
CHOLEST SERPL-MCNC: 210 MG/DL (ref 100–199)
CO2 SERPL-SCNC: 24 MEQ/L (ref 23–33)
CREAT SERPL-MCNC: 1 MG/DL (ref 0.4–1.2)
CREAT UR-MCNC: 342.5 MG/DL
DEPRECATED MEAN GLUCOSE BLD GHB EST-ACNC: 267 MG/DL (ref 70–126)
DEPRECATED RDW RBC AUTO: 39.2 FL (ref 35–45)
EOSINOPHIL NFR BLD AUTO: 2.5 %
EOSINOPHILS ABSOLUTE: 0.2 THOU/MM3 (ref 0–0.4)
ERYTHROCYTE [DISTWIDTH] IN BLOOD BY AUTOMATED COUNT: 12.8 % (ref 11.5–14.5)
GFR SERPL CREATININE-BSD FRML MDRD: 89 ML/MIN/1.73M2
GLUCOSE SERPL-MCNC: 165 MG/DL (ref 70–108)
HBA1C MFR BLD HPLC: 10.9 % (ref 4.4–6.4)
HCT VFR BLD AUTO: 47 % (ref 42–52)
HDLC SERPL-MCNC: 42 MG/DL
HGB BLD-MCNC: 15.1 GM/DL (ref 14–18)
IMM GRANULOCYTES # BLD AUTO: 0.04 THOU/MM3 (ref 0–0.07)
IMM GRANULOCYTES NFR BLD AUTO: 0.4 %
LDLC SERPL CALC-MCNC: 143 MG/DL
LYMPHOCYTES ABSOLUTE: 3.4 THOU/MM3 (ref 1–4.8)
LYMPHOCYTES NFR BLD AUTO: 37.1 %
MCH RBC QN AUTO: 27.2 PG (ref 26–33)
MCHC RBC AUTO-ENTMCNC: 32.1 GM/DL (ref 32.2–35.5)
MCV RBC AUTO: 84.7 FL (ref 80–94)
MICROALBUMIN UR-MCNC: < 1.2 MG/DL
MICROALBUMIN/CREAT RATIO PNL UR: 4 MG/G (ref 0–30)
MONOCYTES ABSOLUTE: 0.7 THOU/MM3 (ref 0.4–1.3)
MONOCYTES NFR BLD AUTO: 7.8 %
NEUTROPHILS NFR BLD AUTO: 51.6 %
NRBC BLD AUTO-RTO: 0 /100 WBC
PLATELET # BLD AUTO: 242 THOU/MM3 (ref 130–400)
PMV BLD AUTO: 9.7 FL (ref 9.4–12.4)
POTASSIUM SERPL-SCNC: 4.5 MEQ/L (ref 3.5–5.2)
PROT SERPL-MCNC: 6.9 G/DL (ref 6.1–8)
RBC # BLD AUTO: 5.55 MILL/MM3 (ref 4.7–6.1)
SEGMENTED NEUTROPHILS ABSOLUTE COUNT: 4.7 THOU/MM3 (ref 1.8–7.7)
SODIUM SERPL-SCNC: 142 MEQ/L (ref 135–145)
TRIGL SERPL-MCNC: 125 MG/DL (ref 0–199)
TSH SERPL DL<=0.005 MIU/L-ACNC: 2.44 UIU/ML (ref 0.4–4.2)
WBC # BLD AUTO: 9.1 THOU/MM3 (ref 4.8–10.8)

## 2024-05-02 ENCOUNTER — NURSE ONLY (OUTPATIENT)
Age: 54
End: 2024-05-02

## 2024-05-04 LAB — ZONISAMIDE SERPL-MCNC: 25 UG/ML (ref 10–40)

## 2024-05-07 LAB — LACOSAMIDE SERPL-MCNC: 9 UG/ML (ref 1–10)

## 2024-08-23 ENCOUNTER — LAB (OUTPATIENT)
Age: 54
End: 2024-08-23

## 2024-08-27 LAB — LACOSAMIDE SERPL-MCNC: 13.6 UG/ML (ref 1–10)

## 2024-08-28 LAB — ZONISAMIDE SERPL-MCNC: 32 UG/ML (ref 10–40)

## 2024-11-05 ENCOUNTER — LAB (OUTPATIENT)
Age: 54
End: 2024-11-05

## 2024-11-05 LAB
ALBUMIN SERPL BCG-MCNC: 3.9 G/DL (ref 3.5–5.1)
ALP SERPL-CCNC: 117 U/L (ref 38–126)
ALT SERPL W/O P-5'-P-CCNC: 17 U/L (ref 11–66)
ANION GAP SERPL CALC-SCNC: 15 MEQ/L (ref 8–16)
AST SERPL-CCNC: 15 U/L (ref 5–40)
BASOPHILS ABSOLUTE: 0.1 THOU/MM3 (ref 0–0.1)
BASOPHILS NFR BLD AUTO: 0.6 %
BILIRUB SERPL-MCNC: 0.2 MG/DL (ref 0.3–1.2)
BUN SERPL-MCNC: 17 MG/DL (ref 7–22)
CALCIUM SERPL-MCNC: 9.3 MG/DL (ref 8.5–10.5)
CHLORIDE SERPL-SCNC: 106 MEQ/L (ref 98–111)
CHOLEST SERPL-MCNC: 214 MG/DL (ref 100–199)
CO2 SERPL-SCNC: 22 MEQ/L (ref 23–33)
CREAT SERPL-MCNC: 1.1 MG/DL (ref 0.4–1.2)
DEPRECATED MEAN GLUCOSE BLD GHB EST-ACNC: 168 MG/DL (ref 70–126)
DEPRECATED RDW RBC AUTO: 41.8 FL (ref 35–45)
EOSINOPHIL NFR BLD AUTO: 2.6 %
EOSINOPHILS ABSOLUTE: 0.2 THOU/MM3 (ref 0–0.4)
ERYTHROCYTE [DISTWIDTH] IN BLOOD BY AUTOMATED COUNT: 13.6 % (ref 11.5–14.5)
GFR SERPL CREATININE-BSD FRML MDRD: 79 ML/MIN/1.73M2
GLUCOSE SERPL-MCNC: 149 MG/DL (ref 70–108)
HBA1C MFR BLD HPLC: 7.6 % (ref 4.4–6.4)
HCT VFR BLD AUTO: 46.3 % (ref 42–52)
HDLC SERPL-MCNC: 45 MG/DL
HGB BLD-MCNC: 14.8 GM/DL (ref 14–18)
IMM GRANULOCYTES # BLD AUTO: 0.04 THOU/MM3 (ref 0–0.07)
IMM GRANULOCYTES NFR BLD AUTO: 0.5 %
LDLC SERPL CALC-MCNC: 145 MG/DL
LYMPHOCYTES ABSOLUTE: 3 THOU/MM3 (ref 1–4.8)
LYMPHOCYTES NFR BLD AUTO: 35.6 %
MCH RBC QN AUTO: 27.5 PG (ref 26–33)
MCHC RBC AUTO-ENTMCNC: 32 GM/DL (ref 32.2–35.5)
MCV RBC AUTO: 85.9 FL (ref 80–94)
MONOCYTES ABSOLUTE: 0.6 THOU/MM3 (ref 0.4–1.3)
MONOCYTES NFR BLD AUTO: 6.5 %
NEUTROPHILS ABSOLUTE: 4.6 THOU/MM3 (ref 1.8–7.7)
NEUTROPHILS NFR BLD AUTO: 54.2 %
NRBC BLD AUTO-RTO: 0 /100 WBC
PLATELET # BLD AUTO: 230 THOU/MM3 (ref 130–400)
PMV BLD AUTO: 9.9 FL (ref 9.4–12.4)
POTASSIUM SERPL-SCNC: 4.1 MEQ/L (ref 3.5–5.2)
PROT SERPL-MCNC: 6.9 G/DL (ref 6.1–8)
RBC # BLD AUTO: 5.39 MILL/MM3 (ref 4.7–6.1)
SODIUM SERPL-SCNC: 143 MEQ/L (ref 135–145)
TRIGL SERPL-MCNC: 121 MG/DL (ref 0–199)
WBC # BLD AUTO: 8.5 THOU/MM3 (ref 4.8–10.8)

## 2024-11-07 LAB
LACOSAMIDE SERPL-MCNC: 10.2 UG/ML (ref 1–10)
ZONISAMIDE SERPL-MCNC: 30 UG/ML (ref 10–40)

## 2024-11-11 LAB — MISC. #1 REFERENCE GROUP TEST: NORMAL

## 2025-07-14 ENCOUNTER — LAB (OUTPATIENT)
Age: 55
End: 2025-07-14

## 2025-07-14 LAB
ALBUMIN SERPL BCG-MCNC: 3.8 G/DL (ref 3.4–4.9)
ALP SERPL-CCNC: 97 U/L (ref 40–129)
ALT SERPL W/O P-5'-P-CCNC: 12 U/L (ref 10–50)
ANION GAP SERPL CALC-SCNC: 14 MEQ/L (ref 8–16)
AST SERPL-CCNC: 15 U/L (ref 10–50)
BASOPHILS ABSOLUTE: 0 THOU/MM3 (ref 0–0.1)
BASOPHILS NFR BLD AUTO: 0.5 %
BILIRUB SERPL-MCNC: 0.3 MG/DL (ref 0.3–1.2)
BUN SERPL-MCNC: 18 MG/DL (ref 8–23)
CALCIUM SERPL-MCNC: 9.4 MG/DL (ref 8.6–10)
CHLORIDE SERPL-SCNC: 108 MEQ/L (ref 98–111)
CHOLEST SERPL-MCNC: 203 MG/DL (ref 100–199)
CO2 SERPL-SCNC: 18 MEQ/L (ref 22–29)
CREAT SERPL-MCNC: 1.1 MG/DL (ref 0.7–1.2)
CREAT UR-MCNC: 225 MG/DL
DEPRECATED MEAN GLUCOSE BLD GHB EST-ACNC: 183 MG/DL (ref 70–126)
DEPRECATED RDW RBC AUTO: 40.1 FL (ref 35–45)
EOSINOPHIL NFR BLD AUTO: 2.1 %
EOSINOPHILS ABSOLUTE: 0.2 THOU/MM3 (ref 0–0.4)
ERYTHROCYTE [DISTWIDTH] IN BLOOD BY AUTOMATED COUNT: 13 % (ref 11.5–14.5)
GFR SERPL CREATININE-BSD FRML MDRD: 78 ML/MIN/1.73M2
GLUCOSE SERPL-MCNC: 172 MG/DL (ref 74–109)
HBA1C MFR BLD HPLC: 8.1 % (ref 4–6)
HCT VFR BLD AUTO: 45.1 % (ref 42–52)
HDLC SERPL-MCNC: 37 MG/DL
HGB BLD-MCNC: 14.5 GM/DL (ref 14–18)
IMM GRANULOCYTES # BLD AUTO: 0.04 THOU/MM3 (ref 0–0.07)
IMM GRANULOCYTES NFR BLD AUTO: 0.4 %
LDLC SERPL CALC-MCNC: 142 MG/DL
LYMPHOCYTES ABSOLUTE: 2.5 THOU/MM3 (ref 1–4.8)
LYMPHOCYTES NFR BLD AUTO: 25.1 %
MCH RBC QN AUTO: 27.7 PG (ref 26–33)
MCHC RBC AUTO-ENTMCNC: 32.2 GM/DL (ref 32.2–35.5)
MCV RBC AUTO: 86.1 FL (ref 80–94)
MICROALBUMIN UR-MCNC: < 2 MG/DL
MICROALBUMIN/CREAT RATIO PNL UR: 9 MG/G (ref 0–30)
MONOCYTES ABSOLUTE: 0.7 THOU/MM3 (ref 0.4–1.3)
MONOCYTES NFR BLD AUTO: 7.2 %
NEUTROPHILS ABSOLUTE: 6.3 THOU/MM3 (ref 1.8–7.7)
NEUTROPHILS NFR BLD AUTO: 64.7 %
NRBC BLD AUTO-RTO: 0 /100 WBC
PLATELET # BLD AUTO: 254 THOU/MM3 (ref 130–400)
PMV BLD AUTO: 10 FL (ref 9.4–12.4)
POTASSIUM SERPL-SCNC: 4.2 MEQ/L (ref 3.5–5.2)
PROT SERPL-MCNC: 6.7 G/DL (ref 6.4–8.3)
RBC # BLD AUTO: 5.24 MILL/MM3 (ref 4.7–6.1)
SODIUM SERPL-SCNC: 140 MEQ/L (ref 135–145)
TRIGL SERPL-MCNC: 122 MG/DL (ref 0–199)
WBC # BLD AUTO: 9.8 THOU/MM3 (ref 4.8–10.8)

## 2025-07-16 LAB
LACOSAMIDE SERPL-MCNC: 8.8 UG/ML (ref 1–10)
ZONISAMIDE SERPL-MCNC: 31 UG/ML (ref 10–40)